# Patient Record
Sex: MALE | Race: WHITE | NOT HISPANIC OR LATINO | URBAN - METROPOLITAN AREA
[De-identification: names, ages, dates, MRNs, and addresses within clinical notes are randomized per-mention and may not be internally consistent; named-entity substitution may affect disease eponyms.]

---

## 2018-08-14 ENCOUNTER — APPOINTMENT (OUTPATIENT)
Dept: LAB | Facility: HOSPITAL | Age: 68
End: 2018-08-14
Attending: INTERNAL MEDICINE
Payer: COMMERCIAL

## 2018-08-14 ENCOUNTER — OFFICE VISIT (OUTPATIENT)
Dept: PRIMARY CARE | Facility: CLINIC | Age: 68
End: 2018-08-14
Payer: COMMERCIAL

## 2018-08-14 VITALS
TEMPERATURE: 98.1 F | HEART RATE: 74 BPM | BODY MASS INDEX: 27.6 KG/M2 | RESPIRATION RATE: 14 BRPM | OXYGEN SATURATION: 98 % | SYSTOLIC BLOOD PRESSURE: 120 MMHG | HEIGHT: 72 IN | DIASTOLIC BLOOD PRESSURE: 76 MMHG | WEIGHT: 203.8 LBS

## 2018-08-14 DIAGNOSIS — Z12.5 SCREENING FOR MALIGNANT NEOPLASM OF PROSTATE: ICD-10-CM

## 2018-08-14 DIAGNOSIS — Z00.00 PREVENTATIVE HEALTH CARE: Primary | ICD-10-CM

## 2018-08-14 DIAGNOSIS — Z00.00 PREVENTATIVE HEALTH CARE: ICD-10-CM

## 2018-08-14 LAB
ALBUMIN SERPL-MCNC: 4.1 G/DL (ref 3.4–5)
ALP SERPL-CCNC: 85 IU/L (ref 35–126)
ALT SERPL-CCNC: 51 IU/L (ref 16–63)
ANION GAP SERPL CALC-SCNC: 10 MEQ/L (ref 3–15)
AST SERPL-CCNC: 43 IU/L (ref 15–41)
BASOPHILS # BLD: 0.05 K/UL (ref 0.01–0.1)
BASOPHILS NFR BLD: 0.8 %
BILIRUB SERPL-MCNC: 2 MG/DL (ref 0.3–1.2)
BUN SERPL-MCNC: 21 MG/DL (ref 8–20)
CALCIUM SERPL-MCNC: 9.4 MG/DL (ref 8.9–10.3)
CHLORIDE SERPL-SCNC: 105 MMOL/L (ref 98–109)
CHOLEST SERPL-MCNC: 166 MG/DL
CO2 SERPL-SCNC: 25 MMOL/L (ref 22–32)
CREAT SERPL-MCNC: 1 MG/DL (ref 0.8–1.3)
DIFFERENTIAL METHOD BLD: ABNORMAL
EOSINOPHIL # BLD: 0.18 K/UL (ref 0.04–0.54)
EOSINOPHIL NFR BLD: 2.7 %
ERYTHROCYTE [DISTWIDTH] IN BLOOD BY AUTOMATED COUNT: 12.7 % (ref 11.6–14.4)
EST. AVERAGE GLUCOSE BLD GHB EST-MCNC: 105 MG/DL
GFR SERPL CREATININE-BSD FRML MDRD: >60 ML/MIN/1.73M*2
GLUCOSE SERPL-MCNC: 95 MG/DL (ref 70–99)
HBA1C MFR BLD HPLC: 5.3 %
HCT VFR BLDCO AUTO: 41.9 % (ref 40.1–51)
HDLC SERPL-MCNC: 35 MG/DL
HDLC SERPL: 4.7 {RATIO}
HGB BLD-MCNC: 13.8 G/DL (ref 13.7–17.5)
IMM GRANULOCYTES # BLD AUTO: 0.03 K/UL (ref 0–0.08)
IMM GRANULOCYTES NFR BLD AUTO: 0.5 %
LDLC SERPL CALC-MCNC: 106 MG/DL
LYMPHOCYTES # BLD: 1.14 K/UL (ref 1.2–3.5)
LYMPHOCYTES NFR BLD: 17.2 %
MCH RBC QN AUTO: 27.4 PG (ref 28–33.2)
MCHC RBC AUTO-ENTMCNC: 32.9 G/DL (ref 32.2–36.5)
MCV RBC AUTO: 83.1 FL (ref 83–98)
MONOCYTES # BLD: 0.54 K/UL (ref 0.3–1)
MONOCYTES NFR BLD: 8.1 %
NEUTROPHILS # BLD: 4.69 K/UL (ref 1.7–7)
NEUTS SEG NFR BLD: 70.7 %
NONHDLC SERPL-MCNC: 131 MG/DL
NRBC BLD-RTO: 0 %
PDW BLD AUTO: 9.9 FL (ref 9.4–12.4)
PLATELET # BLD AUTO: 213 K/UL (ref 150–350)
POTASSIUM SERPL-SCNC: 4.2 MMOL/L (ref 3.6–5.1)
PROT SERPL-MCNC: 6.1 G/DL (ref 6–8.2)
PSA SERPL-MCNC: 0.14 NG/ML
RBC # BLD AUTO: 5.04 M/UL (ref 4.5–5.8)
SODIUM SERPL-SCNC: 140 MMOL/L (ref 136–144)
TRIGL SERPL-MCNC: 123 MG/DL (ref 30–149)
WBC # BLD AUTO: 6.63 K/UL (ref 3.8–10.5)

## 2018-08-14 PROCEDURE — 84153 ASSAY OF PSA TOTAL: CPT

## 2018-08-14 PROCEDURE — 36415 COLL VENOUS BLD VENIPUNCTURE: CPT

## 2018-08-14 PROCEDURE — 83036 HEMOGLOBIN GLYCOSYLATED A1C: CPT

## 2018-08-14 PROCEDURE — 99397 PER PM REEVAL EST PAT 65+ YR: CPT | Performed by: INTERNAL MEDICINE

## 2018-08-14 PROCEDURE — 80061 LIPID PANEL: CPT

## 2018-08-14 PROCEDURE — 93000 ELECTROCARDIOGRAM COMPLETE: CPT | Performed by: INTERNAL MEDICINE

## 2018-08-14 PROCEDURE — 80053 COMPREHEN METABOLIC PANEL: CPT

## 2018-08-14 PROCEDURE — 85025 COMPLETE CBC W/AUTO DIFF WBC: CPT

## 2018-08-14 RX ORDER — IBUPROFEN 100 MG/5ML
100 SUSPENSION, ORAL (FINAL DOSE FORM) ORAL DAILY
COMMUNITY

## 2018-08-14 RX ORDER — GLUCOSAMINE SULFATE 500 MG
TABLET ORAL
COMMUNITY
End: 2020-11-27 | Stop reason: ENTERED-IN-ERROR

## 2018-08-14 RX ORDER — GARLIC 1000 MG
CAPSULE ORAL DAILY
COMMUNITY

## 2018-08-14 RX ORDER — CALCIUM CARB/VITAMIN D3/VIT K1 500-500-40
1 TABLET,CHEWABLE ORAL DAILY
COMMUNITY

## 2018-08-14 RX ORDER — B-COMPLEX WITH VITAMIN C
1 TABLET ORAL DAILY
COMMUNITY
End: 2020-11-27 | Stop reason: ENTERED-IN-ERROR

## 2018-08-14 RX ORDER — MOMETASONE FUROATE MONOHYDRATE 50 UG/1
SPRAY, METERED NASAL
COMMUNITY
Start: 2017-09-25 | End: 2018-09-18 | Stop reason: SDUPTHER

## 2018-08-14 RX ORDER — MULTIVITAMIN
1 TABLET ORAL DAILY
COMMUNITY
Start: 2011-07-29

## 2018-08-14 RX ORDER — LANOLIN ALCOHOL/MO/W.PET/CERES
100 CREAM (GRAM) TOPICAL DAILY
COMMUNITY

## 2018-08-14 RX ORDER — VIT C/E/ZN/COPPR/LUTEIN/ZEAXAN 250MG-90MG
1000 CAPSULE ORAL DAILY
COMMUNITY

## 2018-08-14 ASSESSMENT — ENCOUNTER SYMPTOMS
BRUISES/BLEEDS EASILY: 0
ENDOCRINE NEGATIVE: 1
NERVOUS/ANXIOUS: 0
SLEEP DISTURBANCE: 0
COUGH: 0
WOUND: 0
DIZZINESS: 0
ARTHRALGIAS: 1
ADENOPATHY: 0
WHEEZING: 0
CARDIOVASCULAR NEGATIVE: 1
NECK PAIN: 0
CONFUSION: 0
NAUSEA: 0
CONSTITUTIONAL NEGATIVE: 1
BLOOD IN STOOL: 0
ABDOMINAL DISTENTION: 0
BACK PAIN: 0
TREMORS: 0
DIARRHEA: 0
SORE THROAT: 0
HEMATURIA: 0
FREQUENCY: 0
WEAKNESS: 0
LIGHT-HEADEDNESS: 0
HEADACHES: 0
SEIZURES: 0
APNEA: 0
TROUBLE SWALLOWING: 0
SHORTNESS OF BREATH: 0
VOMITING: 0
ABDOMINAL PAIN: 0
DYSURIA: 0

## 2018-08-14 NOTE — ASSESSMENT & PLAN NOTE
I am deferring his GLENN since he is going to be checking his PSA shortly and I wish to avoid spurious elevation of this lab result.

## 2018-08-14 NOTE — ASSESSMENT & PLAN NOTE
Screening blood work ordered I await the results.  His last colonoscopy was performed about 3 or 4 years ago, he may well be due next year but I do not have access to his most recent report which he will track down from

## 2018-08-14 NOTE — PROGRESS NOTES
Subjective      Patient ID: Dannie Hardin is a 68 y.o. male.    HPI    The following have been reviewed and updated as appropriate in this visit:       Review of Systems   Constitutional: Negative.    HENT: Negative for congestion, hearing loss, nosebleeds, postnasal drip, sore throat, tinnitus and trouble swallowing.    Eyes: Negative for visual disturbance.   Respiratory: Negative for apnea, cough, shortness of breath and wheezing.    Cardiovascular: Negative.    Gastrointestinal: Negative for abdominal distention, abdominal pain, blood in stool, diarrhea, nausea and vomiting.   Endocrine: Negative.    Genitourinary: Negative for dysuria, frequency, hematuria and urgency.        No nocturia     Musculoskeletal: Positive for arthralgias (R foot pain). Negative for back pain and neck pain.   Skin: Negative for rash and wound.   Allergic/Immunologic: Negative for environmental allergies.   Neurological: Negative for dizziness, tremors, seizures, syncope, weakness, light-headedness and headaches.   Hematological: Negative for adenopathy. Does not bruise/bleed easily.   Psychiatric/Behavioral: Negative for confusion and sleep disturbance. The patient is not nervous/anxious.    viable    Objective     Physical Exam   Constitutional: He appears well-developed and well-nourished.   HENT:   Head: Normocephalic.   Eyes: Pupils are equal, round, and reactive to light.   Neck: Normal range of motion. No JVD present. No thyromegaly present.   Cardiovascular: Normal rate and regular rhythm.    No murmur heard.  Pulmonary/Chest: Effort normal and breath sounds normal. No respiratory distress. He has no wheezes. He has no rales.   Abdominal: Soft. He exhibits no distension. There is no tenderness.   Musculoskeletal: Normal range of motion. He exhibits no edema.   Lymphadenopathy:     He has no cervical adenopathy.   Neurological: He is alert. No cranial nerve deficit.   Skin: Skin is warm.   Psychiatric: He has a normal mood and  affect. His behavior is normal.   Vitals reviewed.      Assessment/Plan   Problem List Items Addressed This Visit     None      Visit Diagnoses     Preventative health care    -  Primary    Relevant Orders    ECG 12 LEAD OFFICE PERFORMED (Completed)    CBC and Differential    Comprehensive metabolic panel    Lipid panel    Hemoglobin A1c    Screening for malignant neoplasm of prostate        Relevant Orders    PSA

## 2018-09-04 ENCOUNTER — TELEPHONE (OUTPATIENT)
Dept: INTERNAL MEDICINE | Facility: HOSPITAL | Age: 68
End: 2018-09-04

## 2018-09-04 ENCOUNTER — TELEPHONE (OUTPATIENT)
Dept: PRIMARY CARE | Facility: CLINIC | Age: 68
End: 2018-09-04

## 2018-09-04 NOTE — TELEPHONE ENCOUNTER
Left message in regards stating that forms being completed as well as faxed over. If have any questions give office a call back

## 2018-09-18 RX ORDER — MOMETASONE FUROATE MONOHYDRATE 50 UG/1
1 SPRAY, METERED NASAL DAILY
Qty: 17 G | Refills: 2 | Status: SHIPPED | OUTPATIENT
Start: 2018-09-18 | End: 2019-01-08 | Stop reason: ALTCHOICE

## 2018-10-17 ENCOUNTER — TRANSCRIBE ORDERS (OUTPATIENT)
Dept: SCHEDULING | Age: 68
End: 2018-10-17

## 2018-10-17 DIAGNOSIS — M79.671 PAIN OF RIGHT FOOT: Primary | ICD-10-CM

## 2018-10-18 ENCOUNTER — HOSPITAL ENCOUNTER (OUTPATIENT)
Dept: RADIOLOGY | Facility: HOSPITAL | Age: 68
Discharge: HOME | End: 2018-10-18
Attending: PODIATRIST
Payer: COMMERCIAL

## 2018-10-18 DIAGNOSIS — M79.671 PAIN OF RIGHT FOOT: ICD-10-CM

## 2018-10-18 PROCEDURE — 77073 BONE LENGTH STUDIES: CPT

## 2019-01-08 ENCOUNTER — TELEPHONE (OUTPATIENT)
Dept: PRIMARY CARE | Facility: CLINIC | Age: 69
End: 2019-01-08

## 2019-01-08 RX ORDER — FLUTICASONE PROPIONATE 50 MCG
1 SPRAY, SUSPENSION (ML) NASAL DAILY
Qty: 1 BOTTLE | Refills: 3 | Status: SHIPPED | OUTPATIENT
Start: 2019-01-08 | End: 2019-08-14 | Stop reason: SDUPTHER

## 2019-03-01 ENCOUNTER — OFFICE VISIT (OUTPATIENT)
Dept: PRIMARY CARE | Facility: CLINIC | Age: 69
End: 2019-03-01
Payer: COMMERCIAL

## 2019-03-01 VITALS
HEIGHT: 72 IN | HEART RATE: 84 BPM | OXYGEN SATURATION: 96 % | TEMPERATURE: 98 F | WEIGHT: 208 LBS | RESPIRATION RATE: 17 BRPM | SYSTOLIC BLOOD PRESSURE: 112 MMHG | BODY MASS INDEX: 28.17 KG/M2 | DIASTOLIC BLOOD PRESSURE: 70 MMHG

## 2019-03-01 DIAGNOSIS — N52.9 ERECTILE DYSFUNCTION, UNSPECIFIED ERECTILE DYSFUNCTION TYPE: ICD-10-CM

## 2019-03-01 DIAGNOSIS — S92.344D CLOSED NONDISPLACED FRACTURE OF FOURTH METATARSAL BONE OF RIGHT FOOT WITH ROUTINE HEALING, SUBSEQUENT ENCOUNTER: ICD-10-CM

## 2019-03-01 DIAGNOSIS — G62.9 NEUROPATHY: Primary | ICD-10-CM

## 2019-03-01 PROCEDURE — 99214 OFFICE O/P EST MOD 30 MIN: CPT | Performed by: INTERNAL MEDICINE

## 2019-03-01 RX ORDER — SILDENAFIL 100 MG/1
100 TABLET, FILM COATED ORAL DAILY PRN
Qty: 10 TABLET | Refills: 5 | Status: SHIPPED | OUTPATIENT
Start: 2019-03-01 | End: 2020-10-05

## 2019-03-01 RX ORDER — GABAPENTIN 100 MG/1
100 CAPSULE ORAL 3 TIMES DAILY
Qty: 90 CAPSULE | Refills: 1 | Status: SHIPPED | OUTPATIENT
Start: 2019-03-01 | End: 2019-04-21 | Stop reason: SDUPTHER

## 2019-03-01 ASSESSMENT — ENCOUNTER SYMPTOMS
NECK STIFFNESS: 0
DIZZINESS: 0
VOMITING: 0
ADENOPATHY: 0
POLYDIPSIA: 0
SHORTNESS OF BREATH: 0
NAUSEA: 0
ARTHRALGIAS: 1
LIGHT-HEADEDNESS: 0
SLEEP DISTURBANCE: 0
FATIGUE: 0
JOINT SWELLING: 0
TROUBLE SWALLOWING: 0
BACK PAIN: 0
NERVOUS/ANXIOUS: 0

## 2019-03-01 NOTE — PROGRESS NOTES
Subjective      Patient ID: Dannie Hardin is a 68 y.o. male.    He is here for follow-up and to address a few issues  1.  Paresthesias in both feet  2.  Erectile dysfunction issues  3.  Issues related to metatarsal fracture right foot        The following have been reviewed and updated as appropriate in this visit:       Review of Systems   Constitutional: Negative for fatigue.   HENT: Negative for trouble swallowing.    Eyes: Negative for visual disturbance.   Respiratory: Negative for shortness of breath.    Cardiovascular: Negative for chest pain.   Gastrointestinal: Negative for nausea and vomiting.   Endocrine: Negative for polydipsia and polyuria.   Genitourinary:        No nocturia   Musculoskeletal: Positive for arthralgias (resolving 4th foot pain). Negative for back pain, joint swelling and neck stiffness.   Neurological: Negative for dizziness and light-headedness.        Feet feel cold   Hematological: Negative for adenopathy.   Psychiatric/Behavioral: Negative for sleep disturbance. The patient is not nervous/anxious.    viable    Objective     Physical Exam   Constitutional: He appears well-developed.   HENT:   Head: Normocephalic.   Cardiovascular: Normal rate and regular rhythm.    Good pulsation B/L tibial   Pulmonary/Chest: Effort normal and breath sounds normal.   Musculoskeletal: Normal range of motion. He exhibits edema (R foot swelling).   Neurological: He is alert.   Vitals reviewed.      Assessment/Plan   Problem List Items Addressed This Visit     Neuropathy - Primary     He complains of a feeling of having mehreen under both feet when he walks especially problematic in the morning.  Is unclear what is causing this and an examination of his foot is fairly unremarkable.  He has been using insoles which have not been helpful and his last A1c was below the prediabetic range when taken about 6 months ago.  At this time to try low-dose gabapentin and I provided a referral for him to see a  neurologist since he would prefer not to go back to podiatrist at this time         Relevant Orders    Ambulatory referral to Neurology    Nondisplaced fracture of fourth metatarsal bone of right foot with routine healing     Comments of [facility] orthopedic group are as noted.  There is still some swelling of his right foot but overall he states the pain is diminished.  Nothing new is warranted at this time in terms of any evaluation or intervention         Erectile dysfunction     This is an ongoing and understandably frustrating issue for him.  At this time I want to check a testosterone level and I prescribed Viagra for him.  He remains very physically active and his weight is fairly stable having only gained 5 pounds in the last several months.         Relevant Orders    Testosterone, total and free

## 2019-03-01 NOTE — ASSESSMENT & PLAN NOTE
This is an ongoing and understandably frustrating issue for him.  At this time I want to check a testosterone level and I prescribed Viagra for him.  He remains very physically active and his weight is fairly stable having only gained 5 pounds in the last several months.

## 2019-03-01 NOTE — ASSESSMENT & PLAN NOTE
Comments of [facility] orthopedic group are as noted.  There is still some swelling of his right foot but overall he states the pain is diminished.  Nothing new is warranted at this time in terms of any evaluation or intervention

## 2019-03-01 NOTE — ASSESSMENT & PLAN NOTE
He complains of a feeling of having mehreen under both feet when he walks especially problematic in the morning.  Is unclear what is causing this and an examination of his foot is fairly unremarkable.  He has been using insoles which have not been helpful and his last A1c was below the prediabetic range when taken about 6 months ago.  At this time to try low-dose gabapentin and I provided a referral for him to see a neurologist since he would prefer not to go back to podiatrist at this time

## 2019-03-05 LAB
TESTOST FREE SERPL-MCNC: 5.2 PG/ML (ref 6.6–18.1)
TESTOST SERPL-MCNC: 304 NG/DL (ref 264–916)

## 2019-03-12 ENCOUNTER — TELEPHONE (OUTPATIENT)
Dept: PRIMARY CARE | Facility: CLINIC | Age: 69
End: 2019-03-12

## 2019-03-12 NOTE — TELEPHONE ENCOUNTER
LVM to return my call.      ----- Message from Edenilson Haile MD sent at 3/9/2019  2:13 PM EST -----  This is a 68-year-old patient of mine who is in for routine follow-up visit, complaints primarily being neuropathy and some issue with ED  I checked a testosterone level which is in the low end of normal at 304 with a slightly low free testosterone level of 5.2.  He might benefit from testosterone and if he wants that we can try it, highlighting the risks associated with in terms of a slight increased risk of prostate problems.  If you would like to try we can do AndroGel.  I did prescribe Viagra for him already

## 2019-03-14 RX ORDER — TESTOSTERONE 25 MG/2.5G
25 GEL TRANSDERMAL DAILY
Qty: 30 PACKET | Refills: 5 | Status: SHIPPED | OUTPATIENT
Start: 2019-03-14 | End: 2019-04-24 | Stop reason: ALTCHOICE

## 2019-04-01 ENCOUNTER — OFFICE VISIT (OUTPATIENT)
Dept: NEUROLOGY | Facility: CLINIC | Age: 69
End: 2019-04-01
Payer: COMMERCIAL

## 2019-04-01 ENCOUNTER — APPOINTMENT (OUTPATIENT)
Dept: LAB | Facility: HOSPITAL | Age: 69
End: 2019-04-01
Attending: PSYCHIATRY & NEUROLOGY
Payer: COMMERCIAL

## 2019-04-01 VITALS — HEART RATE: 82 BPM | DIASTOLIC BLOOD PRESSURE: 76 MMHG | RESPIRATION RATE: 16 BRPM | SYSTOLIC BLOOD PRESSURE: 122 MMHG

## 2019-04-01 DIAGNOSIS — R52 PAIN: ICD-10-CM

## 2019-04-01 DIAGNOSIS — R20.0 NUMBNESS: ICD-10-CM

## 2019-04-01 DIAGNOSIS — R52 PAIN: Primary | ICD-10-CM

## 2019-04-01 LAB
FOLATE SERPL-MCNC: >20 NG/ML
TSH SERPL DL<=0.05 MIU/L-ACNC: 2.35 MIU/L (ref 0.34–5.6)
VIT B12 SERPL-MCNC: 195 PG/ML (ref 180–914)

## 2019-04-01 PROCEDURE — 99244 OFF/OP CNSLTJ NEW/EST MOD 40: CPT | Performed by: PSYCHIATRY & NEUROLOGY

## 2019-04-01 PROCEDURE — 86618 LYME DISEASE ANTIBODY: CPT

## 2019-04-01 PROCEDURE — 84165 PROTEIN E-PHORESIS SERUM: CPT

## 2019-04-01 PROCEDURE — 36415 COLL VENOUS BLD VENIPUNCTURE: CPT

## 2019-04-01 PROCEDURE — 82607 VITAMIN B-12: CPT

## 2019-04-01 PROCEDURE — 82746 ASSAY OF FOLIC ACID SERUM: CPT

## 2019-04-01 PROCEDURE — 86592 SYPHILIS TEST NON-TREP QUAL: CPT

## 2019-04-01 PROCEDURE — 84443 ASSAY THYROID STIM HORMONE: CPT

## 2019-04-01 RX ORDER — GLUCOSAMINE/CHONDR SU A SOD 167-133 MG
500 CAPSULE ORAL
COMMUNITY

## 2019-04-01 RX ORDER — AMOXICILLIN 500 MG/1
1 CAPSULE ORAL 4 TIMES DAILY
Refills: 1 | COMMUNITY
Start: 2019-03-24 | End: 2019-04-24 | Stop reason: ALTCHOICE

## 2019-04-01 NOTE — LETTER
April 1, 2019     Edenilson Haile MD  [address]  [address]  [facility] [place] [address]    Patient: Dannie Hardin   YOB: 1950   Date of Visit: 4/1/2019       Dear Dr. Haile:    Thank you for referring Dannie Hardin to me for evaluation. Below are my notes for this consultation.    If you have questions, please do not hesitate to call me. I look forward to following your patient along with you.         Sincerely,        Ceasar French MD        CC: No Recipients  Ceasar French MD  4/1/2019 10:19 AM  Signed  Dannie Hardin is a 69 y.o. male  4/1/2019  Edenilson Haile MD    Neurology Consult Note    Subjective     Dannie Hardin is a 69 y.o. male who is being evaluated for foot symptoms.  The patient reported that about 1 year ago he began to experience numbness and tingling in the plantar surface of the feet right equal to left.  Initially his symptoms would come and go however over time have become more frequent and severe.  Towards the summer 2018, the patient reported pain in the plantar surface of the right foot without any illness, trauma or inciting event.  He was evaluated by a podiatrist who told him that he had a previous stress fracture.  He wore a boot however his condition got worse.  He was then evaluated by another podiatrist who told him that he did have a stress fracture and again the patient wore a boot.    The patient reported a combination of numbness, paresthesias and dysesthesias mostly over the distal plantar surface of the feet now right greater than left.  The patient started wearing orthotics and attend physical therapy and his pain has improved however his numbness and paresthesias remain about the same.  His symptoms tend to be worse in the morning and gets better as the day goes on.  Sometimes he feels slightly off balanced although he is not weak and has not stumbled, fallen or injured himself.  He has been unable to identify a trigger for his symptoms or  factors that make his symptoms better or worse.    The patient reported that his neck is unaffected.  On rare occasions he can have numbness and heaviness in the right arm.  His left arm is unaffected.  He has no lower back pain or sciatica.  He denied weakness, ataxia or bowel or bladder dysfunction.  He has had some autonomic symptoms including produce sweating and erectile dysfunction.  There were no symptoms to suggest increased intracranial pressure, meningitis or systemic illness.    Review of Systems  Constitutional: negative  Eyes: negative  Ears, nose, mouth, throat, and face: negative  Respiratory: negative  Cardiovascular: negative  Gastrointestinal: negative  Genitourinary:negative  Integument/breast: negative  Hematologic/lymphatic: negative  Musculoskeletal:negative  Neurological: negative  Behavioral/Psych: negative  Endocrine: negative  Allergic/Immunologic: negative    Allergy:  Allergies   Allergen Reactions   • No Known Allergies        Current Outpatient Prescriptions   Medication Sig Dispense Refill   • gabapentin (NEURONTIN) 100 mg capsule Take 1 capsule (100 mg total) by mouth 3 (three) times a day. 90 capsule 1   • niacin 500 mg tablet Take 500 mg by mouth daily with breakfast.     • amoxicillin (AMOXIL) 500 mg capsule Take 1 capsule by mouth 4 (four) times a day.  1   • ascorbic acid, vitamin C, (vitamin C) 100 mg tablet Take 100 mg by mouth daily.     • B-complex with vitamin C tablet Take 1 tablet by mouth daily.     • cholecalciferol, vitamin D3, (VITAMIN D3) 1,000 unit capsule Take 1,000 Units by mouth daily.     • chromium picolinate 1,000 mcg tablet Take by mouth.     • fluticasone (FLONASE) 50 mcg/actuation nasal spray Administer 1 spray into each nostril daily. 1 Bottle 3   • garlic 1 mg capsule Take by mouth.     • glucosamine sulfate (GLUCOSAMINE) 500 mg tablet Take by mouth.     • lactobacillus 3/FOS/pantethine (PROBIOTIC AND ACIDOPHILUS ORAL) Take by mouth.     • magnesium 30 mg  tablet Take 30 mg by mouth 2 (two) times a day.     • multivitamin (THERAGRAN) tablet take 1 tablet by oral route  every day with food     • omega 3-dha-epa-fish oil (FISH OIL) 138-183-1,000 mg capsule Take by mouth.     • sildenafil (VIAGRA) 100 mg tablet Take 1 tablet (100 mg total) by mouth daily as needed for erectile dysfunction. 10 tablet 5   • testosterone (ANDROGEL) 1 % (25 mg/2.5gram) gel in packet Place 1 packet (25 mg total) on the skin daily. 30 packet 5   • thiamine (vitamin B-1) 100 mg tablet Take 100 mg by mouth daily.     • vitamin E 200 unit capsule Take 200 Units by mouth daily.       No current facility-administered medications for this visit.        Past Medical History:  Past Medical History:   Diagnosis Date   • Bowel obstruction (CMS/HCC) (HCC)        Past Surgical History:  Past Surgical History:   Procedure Laterality Date   • COLON SURGERY  03/12/2015   • COLON SURGERY  07/13/2015   • FOOT SURGERY Right    • RESECTION SMALL BOWEL / CLOSURE ILEOSTOMY         Social History:  Social History     Social History   • Marital status:      Spouse name: N/A   • Number of children: N/A   • Years of education: N/A     Social History Main Topics   • Smoking status: Former Smoker   • Smokeless tobacco: Never Used   • Alcohol use No   • Drug use: No   • Sexual activity: Not Asked     Other Topics Concern   • None     Social History Narrative   • None       Family History:  Family History   Problem Relation Age of Onset   • Hyperlipidemia Mother    • Angina Mother    • Hypertension Mother    • Stroke Father    • Diabetes Father        Objective     Physical Exam  /76   Pulse 82   Resp 16     General Appearance:  Alert, no distress, appears stated age   Head:  Normocephalic, without obvious abnormality, atraumatic   Eyes:  PERRL, conjunctiva/corneas clear, EOM's intact   Throat:  The tongue and uvula were midline   Neck: Supple, symmetrical, trachea midline, no carotid bruit or JVD   Lungs:   Clear to auscultation bilaterally, respirations unlabored   Chest Wall: No tenderness or deformity   Heart Regular rate and rhythm, S1 and S2 normal, no murmur, rub or gallop   Extremities: Extremities normal, atraumatic, no cyanosis or edema    Musculoskeletal: No injury or deformity   Pulses: 2+ and symmetric all extremities   Skin: Skin color, texture, turgor normal, no rashes or lesions   Behavior/Emotional: Appropriate, cooperative   Neurologic Exam:  Alert and oriented. Attention, concentration, memory, language, visual spatial orientation, executive function is normal.    Pupils equal round and reactive to light. Extraocular movement full with normal pursuit + saccades. No nystagmus noted.   Facial strength and sensation is normal. Hearing normal.  The tongue and uvula were midline. No dysarthria or dysphagia.   Strength was 5/5 in bulbar, axial + extremity muscles.There was normal bulk and tone with no abnormal movements.   Small fiber sensory modalities were reduced in the feet.  There was no dysmetria or cerebellar signs.   The gait was narrow based. Patient was able to tandem walk. Negative Romberg sign. Reflexes were ++ in the arms and plus in the legs. Ledesma sign was negative. Plantar responses were flexor.     Data Reviewed:  Blood work was notable for a normal CBC, CMP and hemoglobin A1c    Problem List Items Addressed This Visit     Pain - Primary    Current Assessment & Plan     The patient has what sounds like a combination of musculoskeletal pain as well as neuropathic pain.  From a musculoskeletal perspective he can certainly continue to follow with his podiatrist and take anti-inflammatory medications as needed.  It also however sounds as though he has neuropathic pain.  He reported that his symptoms have improved somewhat with gabapentin 100 mg twice daily.  I recommended he increase his dose to 3 times daily in the future will certainly titrate the medication up as tolerated.  He is on a  very low dose however reported that he does not like taking medications.         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  We will check blood work to look for organic causes of his condition.  In the future we will check an EMG looking for objective evidence of a neuromuscular disorder.  Small fiber neuropathies can result in sensory symptoms and autonomic dysfunction however fortunately typically do not result in weakness or imbalance.  Additional diagnostics and treatments will depend on his clinical course and the results of his pending studies.         Relevant Orders    Folate    Protein Elect, Serum w/Interp    TSH    Vitamin B12    Lyme EIA reflex WB    RPR    EMG    Nerve conduction test            It was a real pleasure meeting Dannie Hardin today, thank you for allowing me to participate in the medical care. If you have any questions, please call me at any time. Dannie Hardin will follow up with me in the coming weeks to months and keep me updated by telephone. Dannie Hardin knows to notify me immediately if there is any change in the condition or if there are any new symptoms of transient or static neurologic dysfunction.    Ceasar French MD

## 2019-04-01 NOTE — ASSESSMENT & PLAN NOTE
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  We will check blood work to look for organic causes of his condition.  In the future we will check an EMG looking for objective evidence of a neuromuscular disorder.  Small fiber neuropathies can result in sensory symptoms and autonomic dysfunction however fortunately typically do not result in weakness or imbalance.  Additional diagnostics and treatments will depend on his clinical course and the results of his pending studies.

## 2019-04-01 NOTE — PROGRESS NOTES
Dannie Hardin is a 69 y.o. male  4/1/2019  Edenilson Haile MD    Neurology Consult Note    Subjective     Dannie Hardin is a 69 y.o. male who is being evaluated for foot symptoms.  The patient reported that about 1 year ago he began to experience numbness and tingling in the plantar surface of the feet right equal to left.  Initially his symptoms would come and go however over time have become more frequent and severe.  Towards the summer 2018, the patient reported pain in the plantar surface of the right foot without any illness, trauma or inciting event.  He was evaluated by a podiatrist who told him that he had a previous stress fracture.  He wore a boot however his condition got worse.  He was then evaluated by another podiatrist who told him that he did have a stress fracture and again the patient wore a boot.    The patient reported a combination of numbness, paresthesias and dysesthesias mostly over the distal plantar surface of the feet now right greater than left.  The patient started wearing orthotics and attend physical therapy and his pain has improved however his numbness and paresthesias remain about the same.  His symptoms tend to be worse in the morning and gets better as the day goes on.  Sometimes he feels slightly off balanced although he is not weak and has not stumbled, fallen or injured himself.  He has been unable to identify a trigger for his symptoms or factors that make his symptoms better or worse.    The patient reported that his neck is unaffected.  On rare occasions he can have numbness and heaviness in the right arm.  His left arm is unaffected.  He has no lower back pain or sciatica.  He denied weakness, ataxia or bowel or bladder dysfunction.  He has had some autonomic symptoms including produce sweating and erectile dysfunction.  There were no symptoms to suggest increased intracranial pressure, meningitis or systemic illness.    Review of Systems  Constitutional: negative  Eyes:  negative  Ears, nose, mouth, throat, and face: negative  Respiratory: negative  Cardiovascular: negative  Gastrointestinal: negative  Genitourinary:negative  Integument/breast: negative  Hematologic/lymphatic: negative  Musculoskeletal:negative  Neurological: negative  Behavioral/Psych: negative  Endocrine: negative  Allergic/Immunologic: negative    Allergy:  Allergies   Allergen Reactions   • No Known Allergies        Current Outpatient Prescriptions   Medication Sig Dispense Refill   • gabapentin (NEURONTIN) 100 mg capsule Take 1 capsule (100 mg total) by mouth 3 (three) times a day. 90 capsule 1   • niacin 500 mg tablet Take 500 mg by mouth daily with breakfast.     • amoxicillin (AMOXIL) 500 mg capsule Take 1 capsule by mouth 4 (four) times a day.  1   • ascorbic acid, vitamin C, (vitamin C) 100 mg tablet Take 100 mg by mouth daily.     • B-complex with vitamin C tablet Take 1 tablet by mouth daily.     • cholecalciferol, vitamin D3, (VITAMIN D3) 1,000 unit capsule Take 1,000 Units by mouth daily.     • chromium picolinate 1,000 mcg tablet Take by mouth.     • fluticasone (FLONASE) 50 mcg/actuation nasal spray Administer 1 spray into each nostril daily. 1 Bottle 3   • garlic 1 mg capsule Take by mouth.     • glucosamine sulfate (GLUCOSAMINE) 500 mg tablet Take by mouth.     • lactobacillus 3/FOS/pantethine (PROBIOTIC AND ACIDOPHILUS ORAL) Take by mouth.     • magnesium 30 mg tablet Take 30 mg by mouth 2 (two) times a day.     • multivitamin (THERAGRAN) tablet take 1 tablet by oral route  every day with food     • omega 3-dha-epa-fish oil (FISH OIL) 138-183-1,000 mg capsule Take by mouth.     • sildenafil (VIAGRA) 100 mg tablet Take 1 tablet (100 mg total) by mouth daily as needed for erectile dysfunction. 10 tablet 5   • testosterone (ANDROGEL) 1 % (25 mg/2.5gram) gel in packet Place 1 packet (25 mg total) on the skin daily. 30 packet 5   • thiamine (vitamin B-1) 100 mg tablet Take 100 mg by mouth daily.     •  vitamin E 200 unit capsule Take 200 Units by mouth daily.       No current facility-administered medications for this visit.        Past Medical History:  Past Medical History:   Diagnosis Date   • Bowel obstruction (CMS/HCC) (HCC)        Past Surgical History:  Past Surgical History:   Procedure Laterality Date   • COLON SURGERY  03/12/2015   • COLON SURGERY  07/13/2015   • FOOT SURGERY Right    • RESECTION SMALL BOWEL / CLOSURE ILEOSTOMY         Social History:  Social History     Social History   • Marital status:      Spouse name: N/A   • Number of children: N/A   • Years of education: N/A     Social History Main Topics   • Smoking status: Former Smoker   • Smokeless tobacco: Never Used   • Alcohol use No   • Drug use: No   • Sexual activity: Not Asked     Other Topics Concern   • None     Social History Narrative   • None       Family History:  Family History   Problem Relation Age of Onset   • Hyperlipidemia Mother    • Angina Mother    • Hypertension Mother    • Stroke Father    • Diabetes Father        Objective     Physical Exam  /76   Pulse 82   Resp 16     General Appearance:  Alert, no distress, appears stated age   Head:  Normocephalic, without obvious abnormality, atraumatic   Eyes:  PERRL, conjunctiva/corneas clear, EOM's intact   Throat:  The tongue and uvula were midline   Neck: Supple, symmetrical, trachea midline, no carotid bruit or JVD   Lungs:  Clear to auscultation bilaterally, respirations unlabored   Chest Wall: No tenderness or deformity   Heart Regular rate and rhythm, S1 and S2 normal, no murmur, rub or gallop   Extremities: Extremities normal, atraumatic, no cyanosis or edema    Musculoskeletal: No injury or deformity   Pulses: 2+ and symmetric all extremities   Skin: Skin color, texture, turgor normal, no rashes or lesions   Behavior/Emotional: Appropriate, cooperative   Neurologic Exam:  Alert and oriented. Attention, concentration, memory, language, visual spatial  orientation, executive function is normal.    Pupils equal round and reactive to light. Extraocular movement full with normal pursuit + saccades. No nystagmus noted.   Facial strength and sensation is normal. Hearing normal.  The tongue and uvula were midline. No dysarthria or dysphagia.   Strength was 5/5 in bulbar, axial + extremity muscles.There was normal bulk and tone with no abnormal movements.   Small fiber sensory modalities were reduced in the feet.  There was no dysmetria or cerebellar signs.   The gait was narrow based. Patient was able to tandem walk. Negative Romberg sign. Reflexes were ++ in the arms and plus in the legs. Ledesma sign was negative. Plantar responses were flexor.     Data Reviewed:  Blood work was notable for a normal CBC, CMP and hemoglobin A1c    Problem List Items Addressed This Visit     Pain - Primary    Current Assessment & Plan     The patient has what sounds like a combination of musculoskeletal pain as well as neuropathic pain.  From a musculoskeletal perspective he can certainly continue to follow with his podiatrist and take anti-inflammatory medications as needed.  It also however sounds as though he has neuropathic pain.  He reported that his symptoms have improved somewhat with gabapentin 100 mg twice daily.  I recommended he increase his dose to 3 times daily in the future will certainly titrate the medication up as tolerated.  He is on a very low dose however reported that he does not like taking medications.         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  We will check blood work to look for organic causes of his condition.  In the future we will check an EMG looking for objective evidence of a neuromuscular disorder.   Small fiber neuropathies can result in sensory symptoms and autonomic dysfunction however fortunately typically do not result in weakness or imbalance.  Additional diagnostics and treatments will depend on his clinical course and the results of his pending studies.         Relevant Orders    Folate    Protein Elect, Serum w/Interp    TSH    Vitamin B12    Lyme EIA reflex WB    RPR    EMG    Nerve conduction test            It was a real pleasure meeting Dannie Hardin today, thank you for allowing me to participate in the medical care. If you have any questions, please call me at any time. Dannie Hardin will follow up with me in the coming weeks to months and keep me updated by telephone. Dannie Hardin knows to notify me immediately if there is any change in the condition or if there are any new symptoms of transient or static neurologic dysfunction.    Ceasar French MD

## 2019-04-01 NOTE — ASSESSMENT & PLAN NOTE
The patient has what sounds like a combination of musculoskeletal pain as well as neuropathic pain.  From a musculoskeletal perspective he can certainly continue to follow with his podiatrist and take anti-inflammatory medications as needed.  It also however sounds as though he has neuropathic pain.  He reported that his symptoms have improved somewhat with gabapentin 100 mg twice daily.  I recommended he increase his dose to 3 times daily in the future will certainly titrate the medication up as tolerated.  He is on a very low dose however reported that he does not like taking medications.

## 2019-04-02 LAB
ALBUMIN MFR UR ELPH: 49.2 % (ref 48–62)
ALBUMIN SERPL ELPH-MCNC: 3.34 G/DL (ref 3.2–4.5)
ALBUMIN/GLOB SERPL: 1 {RATIO} (ref 1.1–2.1)
ALPHA1 GLOB MFR SERPL ELPH: 3.7 % (ref 2.1–4.8)
ALPHA1 GLOB SERPL ELPH-MCNC: 0.25 G/DL (ref 0.15–0.32)
ALPHA2 GLOB MFR UR ELPH: 14.4 % (ref 9.7–16.2)
ALPHA2 GLOB SERPL ELPH-MCNC: 0.98 G/DL (ref 0.7–1.1)
B BURGDOR AB SER IA-ACNC: 0.11 RATIO
B-GLOBULIN SERPL ELPH-MCNC: 0.95 G/DL (ref 0.73–1.3)
BETA1 GLOB MFR UR ELPH: 14 % (ref 10.8–17.9)
GAMMA GLOB MFR UR ELPH: 18.8 % (ref 9–23)
GAMMA GLOB SERPL ELPH-MCNC: 1.28 G/DL (ref 0.6–1.6)
PROT PATTERN SERPL ELPH-IMP: NORMAL
PROT SERPL-MCNC: 6.8 G/DL (ref 6–8.2)
RPR SER QL: NORMAL

## 2019-04-10 ENCOUNTER — OFFICE VISIT (OUTPATIENT)
Dept: NEUROLOGY | Facility: CLINIC | Age: 69
End: 2019-04-10
Payer: COMMERCIAL

## 2019-04-10 VITALS — HEART RATE: 82 BPM | SYSTOLIC BLOOD PRESSURE: 122 MMHG | RESPIRATION RATE: 16 BRPM | DIASTOLIC BLOOD PRESSURE: 76 MMHG

## 2019-04-10 DIAGNOSIS — R52 PAIN: ICD-10-CM

## 2019-04-10 DIAGNOSIS — R20.0 NUMBNESS: Primary | ICD-10-CM

## 2019-04-10 PROCEDURE — 95886 MUSC TEST DONE W/N TEST COMP: CPT | Performed by: PSYCHIATRY & NEUROLOGY

## 2019-04-10 PROCEDURE — 99999 PR OFFICE/OUTPT VISIT,PROCEDURE ONLY: CPT | Performed by: PSYCHIATRY & NEUROLOGY

## 2019-04-10 PROCEDURE — 95910 NRV CNDJ TEST 7-8 STUDIES: CPT | Performed by: PSYCHIATRY & NEUROLOGY

## 2019-04-10 NOTE — PROCEDURES
Dannie Hardin  1950  4/10/2019  Edenilson Haile MD      Clinical History:  Dannie Hardin is a 69-year-old who has had abnormal sensation in his feet for about 1 year.  He has a combination of numbness, paresthesias and rare dysesthesias.  He denied back pain or weakness.  Sometimes he is slightly off balance.  He has occasional paresthesias in the right arm.    Physical Examination:  He was a pleasant appearing man in no acute distress.  There was normal bulk and tone with no abnormal movements.  Strength was 5/5.  Small fiber sensory modalities were reduced in the feet.  Reflexes were plus plus in the arms and plus in the legs.    Nerve Conduction Studies:  The right sural and radial sensory studies were normal.    The right median, ulnar, peroneal and tibial motor studies were normal.    Needle EMG:  Needle EMG of selected muscles of the right L2-S1 myotomes was performed.  All muscles studied were normal; there was no evidence of acute or chronic denervation.    Impression:  This was a normal study of the right arm and leg.  There was no electrophysiologic evidence of a cervical or lumbosacral radiculopathy or plexopathy, focal neuropathy, polyneuropathy or other neuromuscular disorder at this time.    Clinical Note:  This was a normal study and there was no electrophysiologic evidence of a neuromuscular disorder.  It is certainly possible that the patient has a small fiber polyneuropathy.  Blood work was notable for a low B12 level possibly related to his prior GI surgery.  Folate, TSH, SPEP, Lyme and RPR were normal.    The patient will receive B12 supplementation.  He is on gabapentin 200 mg 3 times daily and reported some improvement in his condition.  In the future additional diagnostics and treatments will depend on his clinical course.      Sincerely,  Ceasar French M.D.   Modified Advancement Flap Text: The defect edges were debeveled with a #15 scalpel blade.  Given the location of the defect, shape of the defect and the proximity to free margins a modified advancement flap was deemed most appropriate.  Using a sterile surgical marker, an appropriate advancement flap was drawn incorporating the defect and placing the expected incisions within the relaxed skin tension lines where possible.    The area thus outlined was incised deep to adipose tissue with a #15 scalpel blade.  The skin margins were undermined to an appropriate distance in all directions utilizing iris scissors.

## 2019-04-10 NOTE — LETTER
April 10, 2019     Edenilson Haile MD  [address]  [place]  [facility] [id number]    Patient: Dannie Hardin   YOB: 1950   Date of Visit: 4/10/2019       Dear Dr. Haile:    Thank you for referring Dannie Hardin to me for evaluation. Below are my notes for this consultation.    If you have questions, please do not hesitate to call me. I look forward to following your patient along with you.         Sincerely,        Ceasar French MD        CC: No Recipients        Ceasar French MD  4/10/2019 10:03 AM  Signed  Dannie Hardin  1950  4/10/2019  Edenilson Haile MD      Clinical History:  Dannie Hardin is a 69-year-old who has had abnormal sensation in his feet for about 1 year.  He has a combination of numbness, paresthesias and rare dysesthesias.  He denied back pain or weakness.  Sometimes he is slightly off balance.  He has occasional paresthesias in the right arm.    Physical Examination:  He was a pleasant appearing man in no acute distress.  There was normal bulk and tone with no abnormal movements.  Strength was 5/5.  Small fiber sensory modalities were reduced in the feet.  Reflexes were plus plus in the arms and plus in the legs.    Nerve Conduction Studies:  The right sural and radial sensory studies were normal.    The right median, ulnar, peroneal and tibial motor studies were normal.    Needle EMG:  Needle EMG of selected muscles of the right L2-S1 myotomes was performed.  All muscles studied were normal; there was no evidence of acute or chronic denervation.    Impression:  This was a normal study of the right arm and leg.  There was no electrophysiologic evidence of a cervical or lumbosacral radiculopathy or plexopathy, focal neuropathy, polyneuropathy or other neuromuscular disorder at this time.    Clinical Note:  This was a normal study and there was no electrophysiologic evidence of a neuromuscular disorder.  It is certainly possible that the patient has a  small fiber polyneuropathy.  Blood work was notable for a low B12 level possibly related to his prior GI surgery.  Folate, TSH, SPEP, Lyme and RPR were normal.    The patient will receive B12 supplementation.  He is on gabapentin 200 mg 3 times daily and reported some improvement in his condition.  In the future additional diagnostics and treatments will depend on his clinical course.      Sincerely,  Ceasar French M.D.

## 2019-04-24 ENCOUNTER — OFFICE VISIT (OUTPATIENT)
Dept: PRIMARY CARE | Facility: CLINIC | Age: 69
End: 2019-04-24
Payer: COMMERCIAL

## 2019-04-24 VITALS
BODY MASS INDEX: 27.9 KG/M2 | TEMPERATURE: 97.8 F | HEART RATE: 85 BPM | RESPIRATION RATE: 16 BRPM | OXYGEN SATURATION: 96 % | HEIGHT: 72 IN | SYSTOLIC BLOOD PRESSURE: 120 MMHG | DIASTOLIC BLOOD PRESSURE: 72 MMHG | WEIGHT: 206 LBS

## 2019-04-24 DIAGNOSIS — G62.9 NEUROPATHY: Primary | ICD-10-CM

## 2019-04-24 DIAGNOSIS — E53.8 B12 DEFICIENCY: ICD-10-CM

## 2019-04-24 PROCEDURE — 90471 IMMUNIZATION ADMIN: CPT | Performed by: INTERNAL MEDICINE

## 2019-04-24 PROCEDURE — 99213 OFFICE O/P EST LOW 20 MIN: CPT | Mod: 25 | Performed by: INTERNAL MEDICINE

## 2019-04-24 PROCEDURE — 96372 THER/PROPH/DIAG INJ SC/IM: CPT | Performed by: INTERNAL MEDICINE

## 2019-04-24 PROCEDURE — 90732 PPSV23 VACC 2 YRS+ SUBQ/IM: CPT | Performed by: INTERNAL MEDICINE

## 2019-04-24 RX ORDER — GABAPENTIN 300 MG/1
300 CAPSULE ORAL 3 TIMES DAILY
Qty: 90 CAPSULE | Refills: 3 | Status: SHIPPED | OUTPATIENT
Start: 2019-04-24 | End: 2019-08-14 | Stop reason: SDUPTHER

## 2019-04-24 RX ORDER — CYANOCOBALAMIN 1000 UG/ML
1000 INJECTION, SOLUTION INTRAMUSCULAR; SUBCUTANEOUS ONCE
Status: COMPLETED | OUTPATIENT
Start: 2019-04-24 | End: 2019-04-24

## 2019-04-24 RX ADMIN — CYANOCOBALAMIN 1000 MCG: 1000 INJECTION, SOLUTION INTRAMUSCULAR; SUBCUTANEOUS at 10:58

## 2019-04-24 ASSESSMENT — ENCOUNTER SYMPTOMS
NAUSEA: 0
ARTHRALGIAS: 0
SHORTNESS OF BREATH: 0
BACK PAIN: 0
NECK PAIN: 0
LIGHT-HEADEDNESS: 0
FATIGUE: 0
DIZZINESS: 0
VOMITING: 0

## 2019-04-24 NOTE — PROGRESS NOTES
Subjective      Patient ID: Dannie Hardin is a 69 y.o. male.    He is here for follow-up and to address a few issues  1.  Ongoing neuropathic symptoms  2.  Review of correspondences and test results  3.  Concerns about immunizations        The following have been reviewed and updated as appropriate in this visit:       Review of Systems   Constitutional: Negative for fatigue.   Respiratory: Negative for shortness of breath.    Cardiovascular: Negative for chest pain.   Gastrointestinal: Negative for nausea and vomiting.   Musculoskeletal: Negative for arthralgias, back pain and neck pain.   Neurological: Negative for dizziness and light-headedness.        Neuropathy in feet   viable    Objective     Physical Exam   Constitutional: He appears well-developed and well-nourished.   HENT:   Head: Normocephalic and atraumatic.   Eyes: Pupils are equal, round, and reactive to light. Conjunctivae are normal.   Neck: Normal range of motion.   Cardiovascular: Normal rate and regular rhythm.    Pulmonary/Chest: Effort normal and breath sounds normal.   Musculoskeletal: Normal range of motion. He exhibits no edema.   Neurological:   ? Tongue deviation to R   Vitals reviewed.      Assessment/Plan   Problem List Items Addressed This Visit     Neuropathy - Primary     Comments provided by neurology are reviewed.  It is unclear exactly what the etiology is but given the fact that his B12 levels borderline low he may well benefit from B12 injections which will be initiated today.  We will continue to monitor his symptoms going forward and note that all other lab tests associated with this diagnosis were on

## 2019-04-24 NOTE — ASSESSMENT & PLAN NOTE
Comments provided by neurology are reviewed.  It is unclear exactly what the etiology is but given the fact that his B12 levels borderline low he may well benefit from B12 injections which will be initiated today.  We will continue to monitor his symptoms going forward and note that all other lab tests associated with this diagnosis were on

## 2019-04-30 ENCOUNTER — CLINICAL SUPPORT (OUTPATIENT)
Dept: PRIMARY CARE | Facility: CLINIC | Age: 69
End: 2019-04-30
Payer: COMMERCIAL

## 2019-04-30 DIAGNOSIS — E53.8 B12 DEFICIENCY: Primary | ICD-10-CM

## 2019-04-30 PROCEDURE — 96372 THER/PROPH/DIAG INJ SC/IM: CPT | Performed by: INTERNAL MEDICINE

## 2019-04-30 RX ORDER — CYANOCOBALAMIN 1000 UG/ML
1000 INJECTION, SOLUTION INTRAMUSCULAR; SUBCUTANEOUS ONCE
Status: COMPLETED | OUTPATIENT
Start: 2019-04-30 | End: 2019-04-30

## 2019-04-30 RX ADMIN — CYANOCOBALAMIN 1000 MCG: 1000 INJECTION, SOLUTION INTRAMUSCULAR; SUBCUTANEOUS at 15:32

## 2019-05-07 ENCOUNTER — CLINICAL SUPPORT (OUTPATIENT)
Dept: PRIMARY CARE | Facility: CLINIC | Age: 69
End: 2019-05-07
Payer: COMMERCIAL

## 2019-05-07 DIAGNOSIS — E53.8 VITAMIN B 12 DEFICIENCY: Primary | ICD-10-CM

## 2019-05-07 PROCEDURE — 96372 THER/PROPH/DIAG INJ SC/IM: CPT | Performed by: INTERNAL MEDICINE

## 2019-05-07 RX ORDER — CYANOCOBALAMIN 1000 UG/ML
1000 INJECTION, SOLUTION INTRAMUSCULAR; SUBCUTANEOUS ONCE
Status: COMPLETED | OUTPATIENT
Start: 2019-05-07 | End: 2019-05-07

## 2019-05-07 RX ADMIN — CYANOCOBALAMIN 1000 MCG: 1000 INJECTION, SOLUTION INTRAMUSCULAR; SUBCUTANEOUS at 15:10

## 2019-05-14 ENCOUNTER — CLINICAL SUPPORT (OUTPATIENT)
Dept: PRIMARY CARE | Facility: CLINIC | Age: 69
End: 2019-05-14
Payer: COMMERCIAL

## 2019-05-14 DIAGNOSIS — E53.8 VITAMIN B 12 DEFICIENCY: Primary | ICD-10-CM

## 2019-05-14 PROCEDURE — 96372 THER/PROPH/DIAG INJ SC/IM: CPT | Performed by: INTERNAL MEDICINE

## 2019-05-14 RX ORDER — CYANOCOBALAMIN 1000 UG/ML
1000 INJECTION, SOLUTION INTRAMUSCULAR; SUBCUTANEOUS ONCE
Status: COMPLETED | OUTPATIENT
Start: 2019-05-14 | End: 2019-05-14

## 2019-05-14 RX ADMIN — CYANOCOBALAMIN 1000 MCG: 1000 INJECTION, SOLUTION INTRAMUSCULAR; SUBCUTANEOUS at 15:14

## 2019-05-21 ENCOUNTER — CLINICAL SUPPORT (OUTPATIENT)
Dept: PRIMARY CARE | Facility: CLINIC | Age: 69
End: 2019-05-21
Payer: COMMERCIAL

## 2019-05-21 DIAGNOSIS — E53.8 VITAMIN B12 DEFICIENCY: Primary | ICD-10-CM

## 2019-05-21 PROCEDURE — 96372 THER/PROPH/DIAG INJ SC/IM: CPT | Performed by: INTERNAL MEDICINE

## 2019-05-21 RX ORDER — CYANOCOBALAMIN 1000 UG/ML
1000 INJECTION, SOLUTION INTRAMUSCULAR; SUBCUTANEOUS ONCE
Status: COMPLETED | OUTPATIENT
Start: 2019-05-21 | End: 2019-05-21

## 2019-05-21 RX ADMIN — CYANOCOBALAMIN 1000 MCG: 1000 INJECTION, SOLUTION INTRAMUSCULAR; SUBCUTANEOUS at 15:12

## 2019-05-28 ENCOUNTER — CLINICAL SUPPORT (OUTPATIENT)
Dept: PRIMARY CARE | Facility: CLINIC | Age: 69
End: 2019-05-28
Payer: COMMERCIAL

## 2019-05-28 DIAGNOSIS — E53.8 B12 DEFICIENCY: Primary | ICD-10-CM

## 2019-05-28 PROCEDURE — 96372 THER/PROPH/DIAG INJ SC/IM: CPT | Performed by: INTERNAL MEDICINE

## 2019-05-28 RX ORDER — CYANOCOBALAMIN 1000 UG/ML
1000 INJECTION, SOLUTION INTRAMUSCULAR; SUBCUTANEOUS ONCE
Status: COMPLETED | OUTPATIENT
Start: 2019-05-28 | End: 2019-05-28

## 2019-05-28 RX ADMIN — CYANOCOBALAMIN 1000 MCG: 1000 INJECTION, SOLUTION INTRAMUSCULAR; SUBCUTANEOUS at 15:06

## 2019-06-04 ENCOUNTER — CLINICAL SUPPORT (OUTPATIENT)
Dept: PRIMARY CARE | Facility: CLINIC | Age: 69
End: 2019-06-04
Payer: COMMERCIAL

## 2019-06-04 DIAGNOSIS — E53.8 B12 DEFICIENCY: Primary | ICD-10-CM

## 2019-06-04 PROCEDURE — 96372 THER/PROPH/DIAG INJ SC/IM: CPT | Performed by: INTERNAL MEDICINE

## 2019-06-04 RX ORDER — CYANOCOBALAMIN 1000 UG/ML
1000 INJECTION, SOLUTION INTRAMUSCULAR; SUBCUTANEOUS ONCE
Status: COMPLETED | OUTPATIENT
Start: 2019-06-04 | End: 2019-06-04

## 2019-06-04 RX ADMIN — CYANOCOBALAMIN 1000 MCG: 1000 INJECTION, SOLUTION INTRAMUSCULAR; SUBCUTANEOUS at 15:15

## 2019-06-11 ENCOUNTER — CLINICAL SUPPORT (OUTPATIENT)
Dept: PRIMARY CARE | Facility: CLINIC | Age: 69
End: 2019-06-11
Payer: COMMERCIAL

## 2019-06-11 DIAGNOSIS — E53.8 VITAMIN B 12 DEFICIENCY: Primary | ICD-10-CM

## 2019-06-11 PROCEDURE — 96372 THER/PROPH/DIAG INJ SC/IM: CPT | Performed by: INTERNAL MEDICINE

## 2019-06-11 RX ORDER — CYANOCOBALAMIN 1000 UG/ML
1000 INJECTION, SOLUTION INTRAMUSCULAR; SUBCUTANEOUS ONCE
Status: COMPLETED | OUTPATIENT
Start: 2019-06-11 | End: 2019-06-11

## 2019-06-11 RX ADMIN — CYANOCOBALAMIN 1000 MCG: 1000 INJECTION, SOLUTION INTRAMUSCULAR; SUBCUTANEOUS at 15:13

## 2019-07-09 ENCOUNTER — CLINICAL SUPPORT (OUTPATIENT)
Dept: PRIMARY CARE | Facility: CLINIC | Age: 69
End: 2019-07-09
Payer: COMMERCIAL

## 2019-07-09 DIAGNOSIS — E53.8 B12 DEFICIENCY: Primary | ICD-10-CM

## 2019-07-09 RX ORDER — CYANOCOBALAMIN 1000 UG/ML
1000 INJECTION, SOLUTION INTRAMUSCULAR; SUBCUTANEOUS ONCE
Status: COMPLETED | OUTPATIENT
Start: 2019-07-09 | End: 2019-07-09

## 2019-07-09 RX ADMIN — CYANOCOBALAMIN 1000 MCG: 1000 INJECTION, SOLUTION INTRAMUSCULAR; SUBCUTANEOUS at 12:09

## 2019-07-18 ENCOUNTER — TELEPHONE (OUTPATIENT)
Dept: PRIMARY CARE | Facility: CLINIC | Age: 69
End: 2019-07-18

## 2019-07-18 DIAGNOSIS — Z12.5 SCREENING FOR PROSTATE CANCER: ICD-10-CM

## 2019-07-18 DIAGNOSIS — Z00.00 ENCOUNTER FOR GENERAL ADULT MEDICAL EXAMINATION WITHOUT ABNORMAL FINDINGS: Primary | ICD-10-CM

## 2019-08-02 LAB
ALBUMIN SERPL-MCNC: 4.3 G/DL (ref 3.6–4.8)
ALBUMIN/GLOB SERPL: 1.5 {RATIO} (ref 1.2–2.2)
ALP SERPL-CCNC: 88 IU/L (ref 39–117)
ALT SERPL-CCNC: 25 IU/L (ref 0–44)
AST SERPL-CCNC: 24 IU/L (ref 0–40)
BASOPHILS # BLD AUTO: 0 X10E3/UL (ref 0–0.2)
BASOPHILS NFR BLD AUTO: 0 %
BILIRUB SERPL-MCNC: 1.7 MG/DL (ref 0–1.2)
BUN SERPL-MCNC: 21 MG/DL (ref 8–27)
BUN/CREAT SERPL: 21 (ref 10–24)
CALCIUM SERPL-MCNC: 9.6 MG/DL (ref 8.6–10.2)
CHLORIDE SERPL-SCNC: 103 MMOL/L (ref 96–106)
CHOLEST SERPL-MCNC: 176 MG/DL (ref 100–199)
CO2 SERPL-SCNC: 25 MMOL/L (ref 20–29)
CREAT SERPL-MCNC: 1.02 MG/DL (ref 0.76–1.27)
EOSINOPHIL # BLD AUTO: 0.2 X10E3/UL (ref 0–0.4)
EOSINOPHIL NFR BLD AUTO: 3 %
ERYTHROCYTE [DISTWIDTH] IN BLOOD BY AUTOMATED COUNT: 14.5 % (ref 12.3–15.4)
GLOBULIN SER CALC-MCNC: 2.8 G/DL (ref 1.5–4.5)
GLUCOSE SERPL-MCNC: 100 MG/DL (ref 65–99)
HBA1C MFR BLD: 5.4 % (ref 4.8–5.6)
HCT VFR BLD AUTO: 44.5 % (ref 37.5–51)
HDLC SERPL-MCNC: 41 MG/DL
HGB BLD-MCNC: 14.6 G/DL (ref 13–17.7)
IMM GRANULOCYTES # BLD AUTO: 0 X10E3/UL (ref 0–0.1)
IMM GRANULOCYTES NFR BLD AUTO: 0 %
LAB CORP EGFR IF AFRICN AM: 86 ML/MIN/1.73
LAB CORP EGFR IF NONAFRICN AM: 75 ML/MIN/1.73
LDLC SERPL CALC-MCNC: 104 MG/DL (ref 0–99)
LYMPHOCYTES # BLD AUTO: 1.3 X10E3/UL (ref 0.7–3.1)
LYMPHOCYTES NFR BLD AUTO: 24 %
MCH RBC QN AUTO: 27.7 PG (ref 26.6–33)
MCHC RBC AUTO-ENTMCNC: 32.8 G/DL (ref 31.5–35.7)
MCV RBC AUTO: 84 FL (ref 79–97)
MONOCYTES # BLD AUTO: 0.4 X10E3/UL (ref 0.1–0.9)
MONOCYTES NFR BLD AUTO: 7 %
NEUTROPHILS # BLD AUTO: 3.6 X10E3/UL (ref 1.4–7)
NEUTROPHILS NFR BLD AUTO: 66 %
PLATELET # BLD AUTO: 220 X10E3/UL (ref 150–450)
POTASSIUM SERPL-SCNC: 4.7 MMOL/L (ref 3.5–5.2)
PROT SERPL-MCNC: 7.1 G/DL (ref 6–8.5)
PSA SERPL-MCNC: 0.2 NG/ML (ref 0–4)
RBC # BLD AUTO: 5.28 X10E6/UL (ref 4.14–5.8)
SODIUM SERPL-SCNC: 140 MMOL/L (ref 134–144)
TRIGL SERPL-MCNC: 154 MG/DL (ref 0–149)
VLDLC SERPL CALC-MCNC: 31 MG/DL (ref 5–40)
WBC # BLD AUTO: 5.4 X10E3/UL (ref 3.4–10.8)

## 2019-08-14 ENCOUNTER — OFFICE VISIT (OUTPATIENT)
Dept: PRIMARY CARE | Facility: CLINIC | Age: 69
End: 2019-08-14
Payer: COMMERCIAL

## 2019-08-14 VITALS
HEIGHT: 72 IN | OXYGEN SATURATION: 97 % | WEIGHT: 202 LBS | DIASTOLIC BLOOD PRESSURE: 74 MMHG | SYSTOLIC BLOOD PRESSURE: 126 MMHG | HEART RATE: 80 BPM | TEMPERATURE: 97.9 F | RESPIRATION RATE: 17 BRPM | BODY MASS INDEX: 27.36 KG/M2

## 2019-08-14 DIAGNOSIS — Z00.00 ENCOUNTER FOR GENERAL ADULT MEDICAL EXAMINATION WITHOUT ABNORMAL FINDINGS: Primary | ICD-10-CM

## 2019-08-14 DIAGNOSIS — E53.8 VITAMIN B12 DEFICIENCY: ICD-10-CM

## 2019-08-14 PROCEDURE — 99397 PER PM REEVAL EST PAT 65+ YR: CPT | Performed by: INTERNAL MEDICINE

## 2019-08-14 RX ORDER — FLUTICASONE PROPIONATE 50 MCG
1 SPRAY, SUSPENSION (ML) NASAL DAILY
Qty: 1 BOTTLE | Refills: 3 | Status: SHIPPED | OUTPATIENT
Start: 2019-08-14 | End: 2019-12-21 | Stop reason: SDUPTHER

## 2019-08-14 RX ORDER — GABAPENTIN 300 MG/1
300 CAPSULE ORAL 3 TIMES DAILY
Qty: 90 CAPSULE | Refills: 3 | Status: SHIPPED | OUTPATIENT
Start: 2019-08-14 | End: 2019-12-17

## 2019-08-14 ASSESSMENT — ENCOUNTER SYMPTOMS
WEAKNESS: 0
TREMORS: 0
ABDOMINAL DISTENTION: 0
ENDOCRINE NEGATIVE: 1
DIARRHEA: 0
NERVOUS/ANXIOUS: 0
WHEEZING: 0
SHORTNESS OF BREATH: 0
ARTHRALGIAS: 0
CONSTITUTIONAL NEGATIVE: 1
NECK PAIN: 0
DYSURIA: 0
SORE THROAT: 0
ADENOPATHY: 0
BRUISES/BLEEDS EASILY: 0
FREQUENCY: 0
COUGH: 0
CARDIOVASCULAR NEGATIVE: 1
SEIZURES: 0
LIGHT-HEADEDNESS: 0
HEADACHES: 0
WOUND: 0
HEMATURIA: 0
SLEEP DISTURBANCE: 0
BLOOD IN STOOL: 0
NUMBNESS: 0
NAUSEA: 0
CONFUSION: 0
VOMITING: 0
ABDOMINAL PAIN: 0
BACK PAIN: 0
TROUBLE SWALLOWING: 0

## 2019-08-14 NOTE — PROGRESS NOTES
Subjective      Patient ID: Dannie Hardin is a 69 y.o. male.    HPI    The following have been reviewed and updated as appropriate in this visit:       Review of Systems   Constitutional: Negative.    HENT: Negative for congestion, hearing loss, nosebleeds, sore throat, tinnitus and trouble swallowing.    Eyes: Negative for visual disturbance.   Respiratory: Negative for cough, shortness of breath and wheezing.    Cardiovascular: Negative.    Gastrointestinal: Negative for abdominal distention, abdominal pain, blood in stool, diarrhea, nausea and vomiting.   Endocrine: Negative.    Genitourinary: Negative for dysuria, frequency, hematuria, scrotal swelling, testicular pain and urgency.        No nocturia   Musculoskeletal: Negative for arthralgias, back pain and neck pain.   Skin: Negative for rash and wound.   Allergic/Immunologic: Negative for environmental allergies.   Neurological: Negative for tremors, seizures, syncope, weakness, light-headedness, numbness and headaches.        Neuropathy, kris in feet   Hematological: Negative for adenopathy. Does not bruise/bleed easily.   Psychiatric/Behavioral: Negative for confusion and sleep disturbance. The patient is not nervous/anxious.    viable    Objective     Physical Exam   Constitutional: He appears well-developed and well-nourished.   HENT:   Head: Normocephalic and atraumatic.   Eyes: Pupils are equal, round, and reactive to light. Conjunctivae are normal.   Neck: Normal range of motion. No JVD present.   Cardiovascular: Normal rate and regular rhythm.    No murmur heard.  Pulmonary/Chest: Effort normal and breath sounds normal. No respiratory distress. He has no wheezes. He has no rales.   Abdominal: Soft. He exhibits no distension. There is no tenderness.   Musculoskeletal: Normal range of motion. He exhibits no edema.   Neurological: He is alert. No cranial nerve deficit.   Skin: Skin is warm.   Psychiatric: He has a normal mood and affect. His behavior is  normal.   Vitals reviewed.      Assessment/Plan   Problem List Items Addressed This Visit     Encounter for general adult medical examination without abnormal findings - Primary     Screening blood work reviewed, no abnormalities seen.  His PSA is also within normal limits.  Most recent colonoscopy was performed about 5 years ago was completely normal and he will not need a repeat one for another 5 years         Vitamin B12 deficiency     This was noted to be borderline low several months ago he stopped getting them feeling that they were not making a difference in his last B12 injection was about 6 weeks ago.  On recheck his level and plan accordingly.  Note that he has a peripheral neuropathy but again this did not seem to alleviate his symptoms in any meaningful way         Relevant Orders    Vitamin B12

## 2019-08-14 NOTE — ASSESSMENT & PLAN NOTE
This was noted to be borderline low several months ago he stopped getting them feeling that they were not making a difference in his last B12 injection was about 6 weeks ago.  On recheck his level and plan accordingly.  Note that he has a peripheral neuropathy but again this did not seem to alleviate his symptoms in any meaningful way

## 2019-08-14 NOTE — ASSESSMENT & PLAN NOTE
Screening blood work reviewed, no abnormalities seen.  His PSA is also within normal limits.  Most recent colonoscopy was performed about 5 years ago was completely normal and he will not need a repeat one for another 5 years

## 2019-08-22 LAB — VIT B12 SERPL-MCNC: 324 PG/ML (ref 232–1245)

## 2019-08-26 ENCOUNTER — TELEPHONE (OUTPATIENT)
Dept: PRIMARY CARE | Facility: CLINIC | Age: 69
End: 2019-08-26

## 2019-08-26 NOTE — TELEPHONE ENCOUNTER
----- Message from Edenilson Haile MD sent at 8/26/2019  2:20 PM EDT -----  Please let him know that his B12 level at 324 is in the low end of normal.  He probably would benefit from B12 injections on a once monthly basis only going forward

## 2019-08-28 ENCOUNTER — TELEPHONE (OUTPATIENT)
Dept: PRIMARY CARE | Facility: CLINIC | Age: 69
End: 2019-08-28

## 2019-08-29 ENCOUNTER — TELEPHONE (OUTPATIENT)
Dept: PRIMARY CARE | Facility: CLINIC | Age: 69
End: 2019-08-29

## 2019-08-29 NOTE — TELEPHONE ENCOUNTER
----- Message from Dannie Hardin sent at 8/28/2019  7:37 PM EDT -----  Regarding: Test Results Question  Contact: [phone]  Received call from Dimas today to review my latest B12 test result. My understanding is Dr Haile wants me to continue the monthly B12 injections.  Told Dimas that doing the shots in the office is costly and inconvenient. He mentioned that self administered or having it done at a retail clinic is also possible. Would prefer to self administer. Does a script need to be called in? Not sure if this would be covered by insurance.  Or is there a certain B12 oral OTC that can be recommended.   thanks   Dannie Hardin   [birth year]

## 2019-09-03 NOTE — TELEPHONE ENCOUNTER
I am perfectly flexible with what he wants.  When he get a chance please reach out to him to see what we can do.  If there is an OTC available or a p.o. version which is covered that would be fine

## 2019-09-04 ENCOUNTER — TELEPHONE (OUTPATIENT)
Dept: PRIMARY CARE | Facility: CLINIC | Age: 69
End: 2019-09-04

## 2019-09-16 ENCOUNTER — OFFICE VISIT (OUTPATIENT)
Dept: NEUROLOGY | Facility: CLINIC | Age: 69
End: 2019-09-16
Payer: COMMERCIAL

## 2019-09-16 VITALS — SYSTOLIC BLOOD PRESSURE: 116 MMHG | DIASTOLIC BLOOD PRESSURE: 62 MMHG | RESPIRATION RATE: 16 BRPM | HEART RATE: 80 BPM

## 2019-09-16 DIAGNOSIS — R20.0 NUMBNESS: ICD-10-CM

## 2019-09-16 DIAGNOSIS — R52 PAIN: Primary | ICD-10-CM

## 2019-09-16 PROCEDURE — 99213 OFFICE O/P EST LOW 20 MIN: CPT | Performed by: PSYCHIATRY & NEUROLOGY

## 2019-09-16 RX ORDER — GABAPENTIN 600 MG/1
600 TABLET ORAL 3 TIMES DAILY
Qty: 90 TABLET | Refills: 3 | Status: SHIPPED | OUTPATIENT
Start: 2019-09-16 | End: 2019-12-17 | Stop reason: SDUPTHER

## 2019-09-16 NOTE — PROGRESS NOTES
Dannie Hardin is a 69 y.o. male  9/16/2019  Edenilson Haile MD    Neurology Follow Up Note    Subjective     Dannie Hardin is a 69 y.o. male who is being evaluated  for foot symptoms.  I previously saw the patient about 5 months ago.  At that time an EMG was unremarkable.  B12 was low and the patient received supplementation.  I recommended gabapentin for a possible small fiber polyneuropathy.    Since his last visit, overall the patient reported that he is frustrated.  He reported that he has both good days and bad days.  On a good day he has mostly numbness and paresthesias without significant dysesthesias.  On a bad day he has more pain in his feet right greater than left.  His symptoms clearly affect his quality of life.  He has been unable to identify a trigger for his pain or factors that make it better or worse and thankfully he has a high pain threshold.    The patient wonders whether his condition is related to a neurologic issue or a musculoskeletal issue.  He has been evaluated by podiatry and orthopedist who have been unable to help him with his condition.  He was told that he had a pre-stress fracture and then a stress fracture which has not healed properly.  He is on gabapentin 300 mg 3 times daily and is tolerating the medication well without any side effects.  He is receiving B12 supplementation and a B12 level was recently 324.    Detailed neurologic and medical review of systems was otherwise unremarkable.  There were no symptoms to suggest increased intracranial pressure, meningitis or systemic illness.  In general the patient eats and sleeps well and his mood is good.     Review of Systems  Constitutional: negative  Eyes: negative  Ears, nose, mouth, throat, and face: negative  Respiratory: negative  Cardiovascular: negative  Gastrointestinal: negative  Genitourinary:negative  Integument/breast: negative  Hematologic/lymphatic: negative  Musculoskeletal:negative  Neurological:  negative  Behavioral/Psych: negative  Endocrine: negative  Allergic/Immunologic: negative    Current Outpatient Prescriptions   Medication Sig Dispense Refill   • gabapentin (NEURONTIN) 300 mg capsule Take 1 capsule (300 mg total) by mouth 3 (three) times a day. 90 capsule 3   • ascorbic acid, vitamin C, (vitamin C) 100 mg tablet Take 100 mg by mouth daily.     • B-complex with vitamin C tablet Take 1 tablet by mouth daily.     • cholecalciferol, vitamin D3, (VITAMIN D3) 1,000 unit capsule Take 1,000 Units by mouth daily.     • chromium picolinate 1,000 mcg tablet Take by mouth.     • fluticasone propionate (FLONASE) 50 mcg/actuation nasal spray Administer 1 spray into each nostril daily. 1 Bottle 3   • gabapentin (NEURONTIN) 600 mg tablet Take 1 tablet (600 mg total) by mouth 3 (three) times a day. 90 tablet 3   • garlic 1 mg capsule Take by mouth. 6 capsules daily      • glucosamine sulfate (GLUCOSAMINE) 500 mg tablet Take by mouth.     • lactobacillus 3/FOS/pantethine (PROBIOTIC AND ACIDOPHILUS ORAL) Take by mouth.     • magnesium 30 mg tablet Take 30 mg by mouth 2 (two) times a day.     • multivitamin (THERAGRAN) tablet take 1 tablet by oral route  every day with food     • niacin 500 mg tablet Take 500 mg by mouth daily with breakfast.     • omega 3-dha-epa-fish oil (FISH OIL) 138-183-1,000 mg capsule Take by mouth.     • sildenafil (VIAGRA) 100 mg tablet Take 1 tablet (100 mg total) by mouth daily as needed for erectile dysfunction. 10 tablet 5   • thiamine (vitamin B-1) 100 mg tablet Take 100 mg by mouth daily.     • vitamin E acetate (VITAMIN E ORAL) Take 400 Units by mouth 4 (four) times a day.         No current facility-administered medications for this visit.        PMH/SH/FH : Unchanged since previous visit.    Objective     Physical Exam  /62   Pulse 80   Resp 16     General Appearance:  Alert, no distress, appears stated age     Data Reviewed:  A recent B12 level was 324    Problem List Items  Addressed This Visit     Pain - Primary    Current Assessment & Plan     It does sound as though the patient has a component of neuropathic pain.  For that reason I recommended that he gradually increase his gabapentin as tolerated.  If ineffective, in the future I would consider initiating treatment with Lyrica or Cymbalta.    It is also possible that the patient has musculoskeletal pain.  He will get another opinion with another podiatrist.  Additional diagnostics and treatments will depend on his clinical course.         Numbness    Current Assessment & Plan     The patient reported numbness.  A comprehensive work-up was only notable for a slightly low B12 level and he is receiving supplementation.  He may have a small fiber polyneuropathy.               It was a real pleasure treating Dannie DOMINIK Wilfred today, thank you for allowing me to participate in the medical care. If you have any questions, please call me at any time. Dannie Hardin will follow up with me in the coming weeks to months and keep me updated by telephone. Dannie Hardin knows to notify me immediately if there is any change in the condition or if there are any new symptoms of transient or static neurologic dysfunction.    Ceasar French MD

## 2019-09-16 NOTE — LETTER
September 16, 2019     Edenilson Haile MD  [address]  [address]  [facility] [place] [address]    Patient: Dannie Hardin  YOB: 1950  Date of Visit: 9/16/2019      Dear Dr. Haile:    Thank you for referring Dannie Hardin to me for evaluation. Below are my notes for this consultation.    If you have questions, please do not hesitate to call me. I look forward to following your patient along with you.         Sincerely,        Ceasar French MD        CC: No Recipients  Ceasar French MD  9/16/2019  9:31 AM  Signed  Dannie Hardin is a 69 y.o. male  9/16/2019  Edenilson Haile MD    Neurology Follow Up Note    Subjective     Dannie Hardin is a 69 y.o. male who is being evaluated  for foot symptoms.  I previously saw the patient about 5 months ago.  At that time an EMG was unremarkable.  B12 was low and the patient received supplementation.  I recommended gabapentin for a possible small fiber polyneuropathy.    Since his last visit, overall the patient reported that he is frustrated.  He reported that he has both good days and bad days.  On a good day he has mostly numbness and paresthesias without significant dysesthesias.  On a bad day he has more pain in his feet right greater than left.  His symptoms clearly affect his quality of life.  He has been unable to identify a trigger for his pain or factors that make it better or worse and thankfully he has a high pain threshold.    The patient wonders whether his condition is related to a neurologic issue or a musculoskeletal issue.  He has been evaluated by podiatry and orthopedist who have been unable to help him with his condition.  He was told that he had a pre-stress fracture and then a stress fracture which has not healed properly.  He is on gabapentin 300 mg 3 times daily and is tolerating the medication well without any side effects.  He is receiving B12 supplementation and a B12 level was recently 324.    Detailed neurologic and  medical review of systems was otherwise unremarkable.  There were no symptoms to suggest increased intracranial pressure, meningitis or systemic illness.  In general the patient eats and sleeps well and his mood is good.     Review of Systems  Constitutional: negative  Eyes: negative  Ears, nose, mouth, throat, and face: negative  Respiratory: negative  Cardiovascular: negative  Gastrointestinal: negative  Genitourinary:negative  Integument/breast: negative  Hematologic/lymphatic: negative  Musculoskeletal:negative  Neurological: negative  Behavioral/Psych: negative  Endocrine: negative  Allergic/Immunologic: negative    Current Outpatient Prescriptions   Medication Sig Dispense Refill   • gabapentin (NEURONTIN) 300 mg capsule Take 1 capsule (300 mg total) by mouth 3 (three) times a day. 90 capsule 3   • ascorbic acid, vitamin C, (vitamin C) 100 mg tablet Take 100 mg by mouth daily.     • B-complex with vitamin C tablet Take 1 tablet by mouth daily.     • cholecalciferol, vitamin D3, (VITAMIN D3) 1,000 unit capsule Take 1,000 Units by mouth daily.     • chromium picolinate 1,000 mcg tablet Take by mouth.     • fluticasone propionate (FLONASE) 50 mcg/actuation nasal spray Administer 1 spray into each nostril daily. 1 Bottle 3   • gabapentin (NEURONTIN) 600 mg tablet Take 1 tablet (600 mg total) by mouth 3 (three) times a day. 90 tablet 3   • garlic 1 mg capsule Take by mouth. 6 capsules daily      • glucosamine sulfate (GLUCOSAMINE) 500 mg tablet Take by mouth.     • lactobacillus 3/FOS/pantethine (PROBIOTIC AND ACIDOPHILUS ORAL) Take by mouth.     • magnesium 30 mg tablet Take 30 mg by mouth 2 (two) times a day.     • multivitamin (THERAGRAN) tablet take 1 tablet by oral route  every day with food     • niacin 500 mg tablet Take 500 mg by mouth daily with breakfast.     • omega 3-dha-epa-fish oil (FISH OIL) 138-183-1,000 mg capsule Take by mouth.     • sildenafil (VIAGRA) 100 mg tablet Take 1 tablet (100 mg total)  by mouth daily as needed for erectile dysfunction. 10 tablet 5   • thiamine (vitamin B-1) 100 mg tablet Take 100 mg by mouth daily.     • vitamin E acetate (VITAMIN E ORAL) Take 400 Units by mouth 4 (four) times a day.         No current facility-administered medications for this visit.        PMH/SH/FH : Unchanged since previous visit.    Objective     Physical Exam  /62   Pulse 80   Resp 16     General Appearance:  Alert, no distress, appears stated age     Data Reviewed:  A recent B12 level was 324    Problem List Items Addressed This Visit     Pain - Primary    Current Assessment & Plan     It does sound as though the patient has a component of neuropathic pain.  For that reason I recommended that he gradually increase his gabapentin as tolerated.  If ineffective, in the future I would consider initiating treatment with Lyrica or Cymbalta.    It is also possible that the patient has musculoskeletal pain.  He will get another opinion with another podiatrist.  Additional diagnostics and treatments will depend on his clinical course.         Numbness    Current Assessment & Plan     The patient reported numbness.  A comprehensive work-up was only notable for a slightly low B12 level and he is receiving supplementation.  He may have a small fiber polyneuropathy.               It was a real pleasure treating Dannie Hardin today, thank you for allowing me to participate in the medical care. If you have any questions, please call me at any time. Dannie Hardin will follow up with me in the coming weeks to months and keep me updated by telephone. Dannie Hardin knows to notify me immediately if there is any change in the condition or if there are any new symptoms of transient or static neurologic dysfunction.    Ceasar French MD

## 2019-09-16 NOTE — ASSESSMENT & PLAN NOTE
It does sound as though the patient has a component of neuropathic pain.  For that reason I recommended that he gradually increase his gabapentin as tolerated.  If ineffective, in the future I would consider initiating treatment with Lyrica or Cymbalta.    It is also possible that the patient has musculoskeletal pain.  He will get another opinion with another podiatrist.  Additional diagnostics and treatments will depend on his clinical course.

## 2019-09-16 NOTE — ASSESSMENT & PLAN NOTE
The patient reported numbness.  A comprehensive work-up was only notable for a slightly low B12 level and he is receiving supplementation.  He may have a small fiber polyneuropathy.

## 2019-10-21 PROBLEM — F14.11 HISTORY OF COCAINE ABUSE (CMS/HCC): Status: ACTIVE | Noted: 2019-10-21

## 2019-12-17 ENCOUNTER — OFFICE VISIT (OUTPATIENT)
Dept: NEUROLOGY | Facility: CLINIC | Age: 69
End: 2019-12-17
Payer: COMMERCIAL

## 2019-12-17 VITALS — RESPIRATION RATE: 15 BRPM | HEART RATE: 85 BPM | DIASTOLIC BLOOD PRESSURE: 70 MMHG | SYSTOLIC BLOOD PRESSURE: 118 MMHG

## 2019-12-17 DIAGNOSIS — R52 PAIN: Primary | ICD-10-CM

## 2019-12-17 DIAGNOSIS — G62.9 NEUROPATHY: Primary | ICD-10-CM

## 2019-12-17 DIAGNOSIS — R20.0 NUMBNESS: ICD-10-CM

## 2019-12-17 PROCEDURE — 99213 OFFICE O/P EST LOW 20 MIN: CPT | Performed by: PSYCHIATRY & NEUROLOGY

## 2019-12-17 RX ORDER — GABAPENTIN 600 MG/1
600 TABLET ORAL 3 TIMES DAILY
Qty: 90 TABLET | Refills: 3 | Status: SHIPPED | OUTPATIENT
Start: 2019-12-17 | End: 2020-04-23 | Stop reason: SDUPTHER

## 2019-12-17 NOTE — LETTER
December 17, 2019     Edenilson Haile MD  [address]  [address]  [facility] [address]    Patient: Dannie Hardin  YOB: 1950  Date of Visit: 12/17/2019      Dear Dr. Haile:    Thank you for referring Dannie Hardin to me for evaluation. Below are my notes for this consultation.    If you have questions, please do not hesitate to call me. I look forward to following your patient along with you.         Sincerely,        Ceasar French MD        CC: No Recipients  Ceasar French MD  12/17/2019  9:31 AM  Signed  Dannie Hardin is a 69 y.o. male  12/17/2019  Edenilson Haile MD    Neurology Follow Up Note    Subjective     Dannie Hardin is a 69 y.o. male who is being evaluated  for neuropathy.  At his last visit, the patient was doing relatively well and I recommended he gradually increase his gabapentin.    Since his last visit, overall the patient reported that he continues to do quite well.  He increased his dose of gabapentin to 600 mg 3 times daily.  He is tolerating the medication well without any side effects.  He reported that his neck, back and arms are completely unaffected.  For the most part his legs feel good as well.  He has some degree of soreness in his feet however does not have any neuropathic pain.  On a good day he is essentially asymptomatic.  On a bad day he has a little aching in his feet although has had no major numbness, paresthesias and certainly no painful dysesthesias.  Perhaps weather changes makes his condition worse.    The patient went on a trip to [place] and was able to stand and walk for long periods of time.  He works out at a gym on a minimum of 5 days a week.  He feels as though he is stronger with increasing weight and repetitions.  He joked that he feels like a kid.  He received some injection and now oral B12 supplementation.  Detailed neurologic and medical review of systems was otherwise unremarkable.  There were no symptoms to suggest  increased intracranial pressure, meningitis or systemic illness.    Review of Systems  Constitutional: negative  Eyes: negative  Ears, nose, mouth, throat, and face: negative  Respiratory: negative  Cardiovascular: negative  Gastrointestinal: negative  Genitourinary:negative  Integument/breast: negative  Hematologic/lymphatic: negative  Musculoskeletal:negative  Neurological: negative  Behavioral/Psych: negative  Endocrine: negative  Allergic/Immunologic: negative    Current Outpatient Medications   Medication Sig Dispense Refill   • ascorbic acid, vitamin C, (vitamin C) 100 mg tablet Take 100 mg by mouth daily.     • B-complex with vitamin C tablet Take 1 tablet by mouth daily.     • cholecalciferol, vitamin D3, (VITAMIN D3) 1,000 unit capsule Take 1,000 Units by mouth daily.     • chromium picolinate 1,000 mcg tablet Take by mouth.     • fluticasone propionate (FLONASE) 50 mcg/actuation nasal spray Administer 1 spray into each nostril daily. 1 Bottle 3   • garlic 1 mg capsule Take by mouth. 6 capsules daily      • glucosamine sulfate (GLUCOSAMINE) 500 mg tablet Take by mouth.     • lactobacillus 3/FOS/pantethine (PROBIOTIC AND ACIDOPHILUS ORAL) Take by mouth.     • magnesium 30 mg tablet Take 30 mg by mouth 2 (two) times a day.     • multivitamin (THERAGRAN) tablet take 1 tablet by oral route  every day with food     • niacin 500 mg tablet Take 500 mg by mouth daily with breakfast.     • omega 3-dha-epa-fish oil (FISH OIL) 138-183-1,000 mg capsule Take by mouth.     • thiamine (vitamin B-1) 100 mg tablet Take 100 mg by mouth daily.     • vitamin E acetate (VITAMIN E ORAL) Take 400 Units by mouth 4 (four) times a day.       • gabapentin (NEURONTIN) 600 mg tablet Take 1 tablet (600 mg total) by mouth 3 (three) times a day. 90 tablet 3   • sildenafil (VIAGRA) 100 mg tablet Take 1 tablet (100 mg total) by mouth daily as needed for erectile dysfunction. 10 tablet 5     No current facility-administered medications for  this visit.        PMH/SH/FH : Unchanged since previous visit.    Objective     Physical Exam  Visit Vitals  /70   Pulse 85   Resp 15       General Appearance:  Alert, no distress, appears stated age               Neurologic Exam:  Alert and oriented. Attention, concentration, memory, language, visual spatial orientation, executive function is normal.    Pupils equal round and reactive to light. Extraocular movement full with normal pursuit + saccades. No nystagmus noted.   Facial strength and sensation is normal. Hearing normal.  The tongue and uvula were midline. No dysarthria or dysphagia.   Strength was 5/5 in bulbar, axial + extremity muscles.There was normal bulk and tone with no abnormal movements.   The sensory examination was normal to touch, temperature and pain, vibration and proprioception. There was no dysmetria or cerebellar signs.   The gait was narrow based. Patient was able to tandem walk. Negative Romberg sign. Reflexes were ++ and symmetric. Ledesma sign was negative. Plantar responses were flexor.       Problem List Items Addressed This Visit        Nervous    Neuropathy - Primary    Current Assessment & Plan     The patient reported numbness.  A comprehensive work-up was only notable for a slightly low B12 level and he is receiving supplementation.  We will check a B12 level.  He may have a small fiber polyneuropathy.  On gabapentin he has absolutely no neuropathic pain and only reported some soreness in his feet.  He will continue gabapentin 600 mg 3 times daily.  I encouraged him to remain as physically and cognitively active as possible.  Additional diagnostics and treatments will depend on his clinical course and the results of his pending studies.         Numbness    Relevant Orders    Vitamin B12          It was a real pleasure treating Dannie Hardin today, thank you for allowing me to participate in the medical care. If you have any questions, please call me at any time. Dannie LEHMAN  Drain will follow up with me in the coming weeks to months and keep me updated by telephone. Dannie Hardin knows to notify me immediately if there is any change in the condition or if there are any new symptoms of transient or static neurologic dysfunction.    Ceasar French MD

## 2019-12-17 NOTE — PROGRESS NOTES
Dannie Hardin is a 69 y.o. male  12/17/2019  Edenilson Haile MD    Neurology Follow Up Note    Subjective     Dannie Hardin is a 69 y.o. male who is being evaluated  for neuropathy.  At his last visit, the patient was doing relatively well and I recommended he gradually increase his gabapentin.    Since his last visit, overall the patient reported that he continues to do quite well.  He increased his dose of gabapentin to 600 mg 3 times daily.  He is tolerating the medication well without any side effects.  He reported that his neck, back and arms are completely unaffected.  For the most part his legs feel good as well.  He has some degree of soreness in his feet however does not have any neuropathic pain.  On a good day he is essentially asymptomatic.  On a bad day he has a little aching in his feet although has had no major numbness, paresthesias and certainly no painful dysesthesias.  Perhaps weather changes makes his condition worse.    The patient went on a trip to [place] and was able to stand and walk for long periods of time.  He works out at a gym on a minimum of 5 days a week.  He feels as though he is stronger with increasing weight and repetitions.  He joked that he feels like a kid.  He received some injection and now oral B12 supplementation.  Detailed neurologic and medical review of systems was otherwise unremarkable.  There were no symptoms to suggest increased intracranial pressure, meningitis or systemic illness.    Review of Systems  Constitutional: negative  Eyes: negative  Ears, nose, mouth, throat, and face: negative  Respiratory: negative  Cardiovascular: negative  Gastrointestinal: negative  Genitourinary:negative  Integument/breast: negative  Hematologic/lymphatic: negative  Musculoskeletal:negative  Neurological: negative  Behavioral/Psych: negative  Endocrine: negative  Allergic/Immunologic: negative    Current Outpatient Medications   Medication Sig Dispense Refill   • ascorbic acid,  vitamin C, (vitamin C) 100 mg tablet Take 100 mg by mouth daily.     • B-complex with vitamin C tablet Take 1 tablet by mouth daily.     • cholecalciferol, vitamin D3, (VITAMIN D3) 1,000 unit capsule Take 1,000 Units by mouth daily.     • chromium picolinate 1,000 mcg tablet Take by mouth.     • fluticasone propionate (FLONASE) 50 mcg/actuation nasal spray Administer 1 spray into each nostril daily. 1 Bottle 3   • garlic 1 mg capsule Take by mouth. 6 capsules daily      • glucosamine sulfate (GLUCOSAMINE) 500 mg tablet Take by mouth.     • lactobacillus 3/FOS/pantethine (PROBIOTIC AND ACIDOPHILUS ORAL) Take by mouth.     • magnesium 30 mg tablet Take 30 mg by mouth 2 (two) times a day.     • multivitamin (THERAGRAN) tablet take 1 tablet by oral route  every day with food     • niacin 500 mg tablet Take 500 mg by mouth daily with breakfast.     • omega 3-dha-epa-fish oil (FISH OIL) 138-183-1,000 mg capsule Take by mouth.     • thiamine (vitamin B-1) 100 mg tablet Take 100 mg by mouth daily.     • vitamin E acetate (VITAMIN E ORAL) Take 400 Units by mouth 4 (four) times a day.       • gabapentin (NEURONTIN) 600 mg tablet Take 1 tablet (600 mg total) by mouth 3 (three) times a day. 90 tablet 3   • sildenafil (VIAGRA) 100 mg tablet Take 1 tablet (100 mg total) by mouth daily as needed for erectile dysfunction. 10 tablet 5     No current facility-administered medications for this visit.        PMH/SH/FH : Unchanged since previous visit.    Objective     Physical Exam  Visit Vitals  /70   Pulse 85   Resp 15       General Appearance:  Alert, no distress, appears stated age               Neurologic Exam:  Alert and oriented. Attention, concentration, memory, language, visual spatial orientation, executive function is normal.    Pupils equal round and reactive to light. Extraocular movement full with normal pursuit + saccades. No nystagmus noted.   Facial strength and sensation is normal. Hearing normal.  The tongue  and uvula were midline. No dysarthria or dysphagia.   Strength was 5/5 in bulbar, axial + extremity muscles.There was normal bulk and tone with no abnormal movements.   The sensory examination was normal to touch, temperature and pain, vibration and proprioception. There was no dysmetria or cerebellar signs.   The gait was narrow based. Patient was able to tandem walk. Negative Romberg sign. Reflexes were ++ and symmetric. Ledesma sign was negative. Plantar responses were flexor.       Problem List Items Addressed This Visit        Nervous    Neuropathy - Primary    Current Assessment & Plan     The patient reported numbness.  A comprehensive work-up was only notable for a slightly low B12 level and he is receiving supplementation.  We will check a B12 level.  He may have a small fiber polyneuropathy.  On gabapentin he has absolutely no neuropathic pain and only reported some soreness in his feet.  He will continue gabapentin 600 mg 3 times daily.  I encouraged him to remain as physically and cognitively active as possible.  Additional diagnostics and treatments will depend on his clinical course and the results of his pending studies.         Numbness    Relevant Orders    Vitamin B12          It was a real pleasure treating Dannie Hardin today, thank you for allowing me to participate in the medical care. If you have any questions, please call me at any time. Dannie Hardin will follow up with me in the coming weeks to months and keep me updated by telephone. Dannie Hardin knows to notify me immediately if there is any change in the condition or if there are any new symptoms of transient or static neurologic dysfunction.    Ceasar French MD

## 2019-12-17 NOTE — ASSESSMENT & PLAN NOTE
The patient reported numbness.  A comprehensive work-up was only notable for a slightly low B12 level and he is receiving supplementation.  We will check a B12 level.  He may have a small fiber polyneuropathy.  On gabapentin he has absolutely no neuropathic pain and only reported some soreness in his feet.  He will continue gabapentin 600 mg 3 times daily.  I encouraged him to remain as physically and cognitively active as possible.  Additional diagnostics and treatments will depend on his clinical course and the results of his pending studies.

## 2019-12-23 RX ORDER — FLUTICASONE PROPIONATE 50 MCG
1 SPRAY, SUSPENSION (ML) NASAL DAILY
Qty: 1 BOTTLE | Refills: 3 | Status: SHIPPED | OUTPATIENT
Start: 2019-12-23 | End: 2020-05-05

## 2019-12-23 NOTE — TELEPHONE ENCOUNTER
Medicine Refill Request    Last Office Visit: 8/14/2019  Next Office Visit: Visit date not found        Current Outpatient Medications:   •  ascorbic acid, vitamin C, (vitamin C) 100 mg tablet, Take 100 mg by mouth daily., Disp: , Rfl:   •  B-complex with vitamin C tablet, Take 1 tablet by mouth daily., Disp: , Rfl:   •  cholecalciferol, vitamin D3, (VITAMIN D3) 1,000 unit capsule, Take 1,000 Units by mouth daily., Disp: , Rfl:   •  chromium picolinate 1,000 mcg tablet, Take by mouth., Disp: , Rfl:   •  fluticasone propionate (FLONASE) 50 mcg/actuation nasal spray, Administer 1 spray into each nostril daily., Disp: 1 Bottle, Rfl: 3  •  gabapentin (NEURONTIN) 600 mg tablet, Take 1 tablet (600 mg total) by mouth 3 (three) times a day., Disp: 90 tablet, Rfl: 3  •  garlic 1 mg capsule, Take by mouth. 6 capsules daily , Disp: , Rfl:   •  glucosamine sulfate (GLUCOSAMINE) 500 mg tablet, Take by mouth., Disp: , Rfl:   •  lactobacillus 3/FOS/pantethine (PROBIOTIC AND ACIDOPHILUS ORAL), Take by mouth., Disp: , Rfl:   •  magnesium 30 mg tablet, Take 30 mg by mouth 2 (two) times a day., Disp: , Rfl:   •  multivitamin (THERAGRAN) tablet, take 1 tablet by oral route  every day with food, Disp: , Rfl:   •  niacin 500 mg tablet, Take 500 mg by mouth daily with breakfast., Disp: , Rfl:   •  omega 3-dha-epa-fish oil (FISH OIL) 138-183-1,000 mg capsule, Take by mouth., Disp: , Rfl:   •  sildenafil (VIAGRA) 100 mg tablet, Take 1 tablet (100 mg total) by mouth daily as needed for erectile dysfunction., Disp: 10 tablet, Rfl: 5  •  thiamine (vitamin B-1) 100 mg tablet, Take 100 mg by mouth daily., Disp: , Rfl:   •  vitamin E acetate (VITAMIN E ORAL), Take 400 Units by mouth 4 (four) times a day.  , Disp: , Rfl:       BP Readings from Last 3 Encounters:   12/17/19 118/70   09/16/19 116/62   08/14/19 126/74       Recent Lab results:  Lab Results   Component Value Date    CHOL 176 08/01/2019   ,   Lab Results   Component Value Date    HDL  41 08/01/2019   ,   Lab Results   Component Value Date    LDLCALC 104 (H) 08/01/2019   ,   Lab Results   Component Value Date    TRIG 154 (H) 08/01/2019        Lab Results   Component Value Date    GLUCOSE 100 (H) 08/01/2019   ,   Lab Results   Component Value Date    HGBA1C 5.4 08/01/2019         Lab Results   Component Value Date    CREATININE 1.02 08/01/2019       Lab Results   Component Value Date    TSH 2.35 04/01/2019

## 2020-04-21 ENCOUNTER — TELEMEDICINE (OUTPATIENT)
Dept: NEUROLOGY | Facility: CLINIC | Age: 70
End: 2020-04-21
Payer: COMMERCIAL

## 2020-04-21 ENCOUNTER — TELEPHONE (OUTPATIENT)
Dept: NEUROLOGY | Facility: CLINIC | Age: 70
End: 2020-04-21

## 2020-04-21 DIAGNOSIS — R53.1 WEAKNESS: ICD-10-CM

## 2020-04-21 DIAGNOSIS — R52 PAIN: Primary | ICD-10-CM

## 2020-04-21 DIAGNOSIS — G62.9 NEUROPATHY: ICD-10-CM

## 2020-04-21 PROCEDURE — 99214 OFFICE O/P EST MOD 30 MIN: CPT | Mod: 95 | Performed by: PSYCHIATRY & NEUROLOGY

## 2020-04-21 RX ORDER — METHYLPREDNISOLONE 4 MG/1
TABLET ORAL
Qty: 21 TABLET | Refills: 0 | Status: SHIPPED | OUTPATIENT
Start: 2020-04-21 | End: 2020-04-28

## 2020-04-21 NOTE — ASSESSMENT & PLAN NOTE
In the past the patient had experience neuropathic pain possibly from a small fiber polyneuropathy.  This has completely resolved on gabapentin which he will continue.    Unfortunately the patient has developed what sounds like radicular pain in the right buttock traveling into the right leg.  Of concern, the patient reported that the leg feels weak.  I am concerned that he has radiculopathy.  The patient will have an MRI of the lumbosacral spine looking for any structural abnormalities.  Down the road we will consider an EMG looking for evidence of a neuromuscular disorder.    Currently the patient is on gabapentin.  He was recently started on Valium.  Ibuprofen has been ineffective.  I recommended a Medrol dose pack.  If ineffective we may consider a pain management evaluation especially if there are structural abnormalities in the spine.  Additional diagnostics and treatments will depend on his clinical course and the results of his pending studies.

## 2020-04-21 NOTE — PROGRESS NOTES
Request for Consent:   Video Encounter   Henrik, my name is Ceasar French MD.  Before we proceed, can you please verify your identification by telling me your full name and date of birth?  Can you tell me who is in the room with you?    You and I are about to have a telemedicine check-in or visit because you have requested it.  This is a live video-conference.  I am a real person, speaking to you in real time.  There is no one else with me on the video-conference.  However, when we use ([employer], [employer], etc) it is important for you to know that the video-conference may not be secure or private.  I am not recording this conversation and I am asking you not to record it.  This telemedicine visit will be billed to your health insurance or you, if you are self-insured.  You understand you will be responsible for any copayments or coinsurances that apply to your telemedicine visit.  Before starting our telemedicine visit, I am required to get your consent for this virtual check-in or visit by telemedicine. Do you consent?    Patient Response to Request for Consent:  Yes      Visit Documentation:  Subjective    This virtual telemedicine visit was performed through [email or web address]    Patient ID: Dannie Hardin is a 70 y.o. male.  1950      HPI   I previously saw the patient months ago.  At that time, neuropathic pain was improving with gabapentin.  I recommended no changes in his medical management.    Since his last visit, the patient reported that his feet have actually been feeling great.  He is taking gabapentin 600 mg 3 times daily.  He tolerates the medication well without any side effects.  He has really had no numbness, paresthesias or dysesthesias in his feet.  His neck and arms remain completely unaffected.    The patient enjoys working out and is obviously frustrated with the coronavirus quarantine.  He reported that he has started to walk long distances approximately 4 to 6 miles a day.  In the past he  remembers having back and leg problems while running and was told that he had piriformis muscle dysfunction.  About 2 weeks ago he began to experience severe pain in the right buttocks.  He denied any other obvious illness, trauma or inciting event.    The patient reported that his right-sided buttock pain can radiate into the hip and into the right anterior thigh and down to below the knee.  He has had no new numbness or paresthesias.  His leg however unfortunately feels heavy weak and clumsy at times.  Any movement whatsoever makes his back and leg pain worse.  His left leg is completely unaffected.  He has had no bowel or bladder dysfunction.  Aleve has provided no improvement in his condition.  He is on day 2 of Valium.  He has had no other diagnostics or treatments.    Aside from his back and leg, the patient otherwise feels great.  He has been eating well.  He has struggled to sleep some because of his pain.  His mood is obviously frustrated.  There were no symptoms to suggest increased intracranial pressure, meningitis or systemic illness.    The following have been reviewed and updated as appropriate in this visit:  Allergies  Meds  Problems       Review of Systems  A comprehensive review of systems was otherwise unremarkable    Assessment/Plan   Diagnoses and all orders for this visit:    Pain (Primary)  Assessment & Plan:  In the past the patient had experience neuropathic pain possibly from a small fiber polyneuropathy.  This has completely resolved on gabapentin which he will continue.    Unfortunately the patient has developed what sounds like radicular pain in the right buttock traveling into the right leg.  Of concern, the patient reported that the leg feels weak.  I am concerned that he has radiculopathy.  The patient will have an MRI of the lumbosacral spine looking for any structural abnormalities.  Down the road we will consider an EMG looking for evidence of a neuromuscular  disorder.    Currently the patient is on gabapentin.  He was recently started on Valium.  Ibuprofen has been ineffective.  I recommended a Medrol dose pack.  If ineffective we may consider a pain management evaluation especially if there are structural abnormalities in the spine.  Additional diagnostics and treatments will depend on his clinical course and the results of his pending studies.    Orders:  -     MRI LUMBAR SPINE WITHOUT CONTRAST; Future  -     EMG; Future  -     Nerve conduction test; Future    Weakness  Assessment & Plan:  It is highly concerning that the patient feels as though his right leg is weak.  For that reason we will check a stat MRI of the lumbosacral spine and consider an EMG down the road.    Orders:  -     MRI LUMBAR SPINE WITHOUT CONTRAST; Future  -     EMG; Future  -     Nerve conduction test; Future    Neuropathy  Assessment & Plan:  The patient reported numbness.  A comprehensive work-up was only notable for a slightly low B12 level and he is receiving supplementation.  He likely has a small fiber polyneuropathy.  He is doing dramatically better on gabapentin; he is tolerating the medication well without any side effects.      Other orders  -     methylPREDNISolone (MEDROLURBANO,) 4 mg tablet; Follow package directions.

## 2020-04-21 NOTE — TELEPHONE ENCOUNTER
I spoke to the patient and his wife regarding his authorization. She stated they may go to another facility and she will call back if they decide to change. I did advice her that she must obtain a disc and bring it to the office for review if it is NOT a [facility] facility    DR BURNETT  MRI LUMBAR SPINE W/O  AUTH: [id number]   EXP: 10/17/2020  JOSIAH MAWR P: [phone]

## 2020-04-21 NOTE — ASSESSMENT & PLAN NOTE
The patient reported numbness.  A comprehensive work-up was only notable for a slightly low B12 level and he is receiving supplementation.  He likely has a small fiber polyneuropathy.  He is doing dramatically better on gabapentin; he is tolerating the medication well without any side effects.

## 2020-04-21 NOTE — ASSESSMENT & PLAN NOTE
It is highly concerning that the patient feels as though his right leg is weak.  For that reason we will check a stat MRI of the lumbosacral spine and consider an EMG down the road.

## 2020-04-22 ENCOUNTER — TELEPHONE (OUTPATIENT)
Dept: NEUROLOGY | Facility: CLINIC | Age: 70
End: 2020-04-22

## 2020-04-22 ENCOUNTER — HOSPITAL ENCOUNTER (OUTPATIENT)
Dept: RADIOLOGY | Age: 70
Discharge: HOME | End: 2020-04-22
Attending: PSYCHIATRY & NEUROLOGY
Payer: COMMERCIAL

## 2020-04-22 DIAGNOSIS — R53.1 WEAKNESS: ICD-10-CM

## 2020-04-22 DIAGNOSIS — R52 PAIN: ICD-10-CM

## 2020-04-22 NOTE — TELEPHONE ENCOUNTER
----- Message from Gretchen Clark sent at 4/22/2020 10:10 AM EDT -----  Regarding: JUST AN FYI FOR  CONCERNING AN MRI  Contact: [phone]  PT'S WIFE(AD) CLD TO LET  KNOW THAT PT HAD THE MRI THIS MORNING @ [facility]. PT HAD A TELEMED VISIT YEST-4/21/20

## 2020-04-23 DIAGNOSIS — R52 PAIN: ICD-10-CM

## 2020-04-23 RX ORDER — GABAPENTIN 600 MG/1
600 TABLET ORAL 3 TIMES DAILY
Qty: 90 TABLET | Refills: 3 | Status: SHIPPED | OUTPATIENT
Start: 2020-04-23 | End: 2020-09-17 | Stop reason: SDUPTHER

## 2020-04-24 ENCOUNTER — TELEPHONE (OUTPATIENT)
Dept: NEUROLOGY | Facility: CLINIC | Age: 70
End: 2020-04-24

## 2020-04-24 NOTE — TELEPHONE ENCOUNTER
Dannie is almost done with the steroid pack but is still experiencing severe pain and very low mobility. His wife called concerned that it is almost the weekend and he is still suffering quite a bit. What can he do going forward?

## 2020-04-27 NOTE — TELEPHONE ENCOUNTER
He is seeing NP via telemed, I will fwd to him to see if and when he will be back in the office to see pt.

## 2020-04-27 NOTE — TELEPHONE ENCOUNTER
----- Message from Vangie Baker sent at 4/27/2020 10:00 AM EDT -----  Regarding: Severe pain  Contact: [phone]  Bhumika Trujillo,   Mr. Pandey's wife ( luzma) just called and stated that her  is still in severe pain even after using the pain patch. She's on the the verge of taking him to ER because the pain he's in is unbearable.

## 2020-04-27 NOTE — TELEPHONE ENCOUNTER
Dr. French wants this patient to have an appointment with Dr. Martina HELTON. Is he seeing new patients? Are you able to facilitate this?

## 2020-04-28 RX ORDER — DIAZEPAM 10 MG/1
TABLET ORAL
COMMUNITY
Start: 2020-04-19 | End: 2020-11-27 | Stop reason: ENTERED-IN-ERROR

## 2020-05-05 RX ORDER — FLUTICASONE PROPIONATE 50 MCG
SPRAY, SUSPENSION (ML) NASAL
Qty: 16 G | Refills: 0 | Status: SHIPPED | OUTPATIENT
Start: 2020-05-05 | End: 2020-09-25

## 2020-05-05 NOTE — TELEPHONE ENCOUNTER
Medicine Refill Request    Last Office Visit: 8/14/2019  Last Telemedicine Visit: Visit date not found    Next Office Visit: Visit date not found  Next Telemedicine Visit: Visit date not found       Current Outpatient Medications: •  ascorbic acid, vitamin C, (vitamin C) 100 mg tablet, Take 100 mg by mouth daily., Disp: , Rfl: •  B-complex with vitamin C tablet, Take 1 tablet by mouth daily., Disp: , Rfl:  •  cholecalciferol, vitamin D3, (VITAMIN D3) 1,000 unit capsule, Take 1,000 Units by mouth daily., Disp: , Rfl: •  chromium picolinate 1,000 mcg tablet, Take by mouth., Disp: , Rfl: •  diazePAM (VALIUM) 10 mg tablet, TAKE 1 TABLET BY MOUTH 2 TO 3 TIMES A DAY AS NEEEDED FOR ANXIETY/BACK AND MUSCLE PAIN/SPASM, Disp: , Rfl: •  fluticasone propionate (FLONASE) 50 mcg/actuation nasal spray, Administer 1 spray into each nostril daily., Disp: 1 Bottle, Rfl: 3•  gabapentin (NEURONTIN) 600 mg tablet, Take 1 tablet (600 mg total) by mouth 3 (three) times a day., Disp: 90 tablet, Rfl: 3•  garlic 1 mg capsule, Take by mouth. 6 capsules daily , Disp: , Rfl: •  glucosamine sulfate (GLUCOSAMINE) 500 mg tablet, Take by mouth., Disp: , Rfl: •  lactobacillus 3/FOS/pantethine (PROBIOTIC AND ACIDOPHILUS ORAL), Take by mouth., Disp: , Rfl: •  magnesium 30 mg tablet, Take 30 mg by mouth 2 (two) times a day., Disp: , Rfl: •  multivitamin (THERAGRAN) tablet, take 1 tablet by oral route  every day with food, Disp: , Rfl: •  niacin 500 mg tablet, Take 500 mg by mouth daily with breakfast., Disp: , Rfl: •  omega 3-dha-epa-fish oil (FISH OIL) 138-183-1,000 mg capsule, Take by mouth., Disp: , Rfl: •  sildenafil (VIAGRA) 100 mg tablet, Take 1 tablet (100 mg total) by mouth daily as needed for erectile dysfunction., Disp: 10 tablet, Rfl: 5•  thiamine (vitamin B-1) 100 mg tablet, Take 100 mg by mouth daily., Disp: , Rfl: •  vitamin E acetate (VITAMIN E ORAL), Take 400 Units by mouth 4 (four) times a day.  , Disp: , Rfl:       BP Readings from  Last 3 Encounters:  12/17/19 : 118/70  09/16/19 : 116/62  08/14/19 : 126/74      Recent Lab results:  Lab Results       Component                Value               Date                       CHOL                     176                 08/01/2019          , Lab Results       Component                Value               Date                       HDL                      41                  08/01/2019          , Lab Results       Component                Value               Date                       LDLCALC                  104 (H)             08/01/2019          , Lab Results       Component                Value               Date                       TRIG                     154 (H)             08/01/2019               Lab Results       Component                Value               Date                       GLUCOSE                  100 (H)             08/01/2019          , Lab Results       Component                Value               Date                       HGBA1C                   5.4                 08/01/2019                Lab Results       Component                Value               Date                       CREATININE               1.02                08/01/2019              Lab Results       Component                Value               Date                       TSH                      2.35                04/01/2019

## 2020-09-17 DIAGNOSIS — R52 PAIN: ICD-10-CM

## 2020-09-17 RX ORDER — GABAPENTIN 600 MG/1
600 TABLET ORAL 3 TIMES DAILY
Qty: 90 TABLET | Refills: 3 | Status: SHIPPED | OUTPATIENT
Start: 2020-09-17 | End: 2021-01-27

## 2020-09-24 RX ORDER — DIFLUPREDNATE 0.5 MG/ML
EMULSION OPHTHALMIC
COMMUNITY
Start: 2020-07-07 | End: 2020-11-27 | Stop reason: ENTERED-IN-ERROR

## 2020-09-24 RX ORDER — DIFLUPREDNATE 0.5 MG/ML
EMULSION OPHTHALMIC
COMMUNITY
Start: 2020-07-30 | End: 2020-11-27 | Stop reason: ENTERED-IN-ERROR

## 2020-09-24 NOTE — TELEPHONE ENCOUNTER
Medicine Refill Request    Last Office Visit: 8/14/2019  Last Telemedicine Visit: Visit date not found    Next Office Visit: 10/5/2020  Next Telemedicine Visit: Visit date not found         Current Outpatient Medications:   •  ascorbic acid, vitamin C, (vitamin C) 100 mg tablet, Take 100 mg by mouth daily., Disp: , Rfl:   •  B-complex with vitamin C tablet, Take 1 tablet by mouth daily., Disp: , Rfl:   •  cholecalciferol, vitamin D3, (VITAMIN D3) 1,000 unit capsule, Take 1,000 Units by mouth daily., Disp: , Rfl:   •  chromium picolinate 1,000 mcg tablet, Take by mouth., Disp: , Rfl:   •  diazePAM (VALIUM) 10 mg tablet, TAKE 1 TABLET BY MOUTH 2 TO 3 TIMES A DAY AS NEEEDED FOR ANXIETY/BACK AND MUSCLE PAIN/SPASM, Disp: , Rfl:   •  DurezoL 0.05 % drops, INSERT 1 DROP IN AFFECTED EYE 4 TIMES A DAY, Disp: , Rfl:   •  DurezoL 0.05 % drops, INSERT 1 DROP IN AFFECTED EYE 4 TIMES A DAY, Disp: , Rfl:   •  fluticasone propionate (FLONASE) 50 mcg/actuation nasal spray, ADMINISTER 1 SPRAY INTO EACH NOSTRIL EVERY DAY, Disp: 16 g, Rfl: 0  •  gabapentin (NEURONTIN) 600 mg tablet, Take 1 tablet (600 mg total) by mouth 3 (three) times a day., Disp: 90 tablet, Rfl: 3  •  garlic 1 mg capsule, Take by mouth. 6 capsules daily , Disp: , Rfl:   •  glucosamine sulfate (GLUCOSAMINE) 500 mg tablet, Take by mouth., Disp: , Rfl:   •  lactobacillus 3/FOS/pantethine (PROBIOTIC AND ACIDOPHILUS ORAL), Take by mouth., Disp: , Rfl:   •  magnesium 30 mg tablet, Take 30 mg by mouth 2 (two) times a day., Disp: , Rfl:   •  multivitamin (THERAGRAN) tablet, take 1 tablet by oral route  every day with food, Disp: , Rfl:   •  niacin 500 mg tablet, Take 500 mg by mouth daily with breakfast., Disp: , Rfl:   •  omega 3-dha-epa-fish oil (FISH OIL) 138-183-1,000 mg capsule, Take by mouth., Disp: , Rfl:   •  sildenafil (VIAGRA) 100 mg tablet, Take 1 tablet (100 mg total) by mouth daily as needed for erectile dysfunction., Disp: 10 tablet, Rfl: 5  •  thiamine  (vitamin B-1) 100 mg tablet, Take 100 mg by mouth daily., Disp: , Rfl:   •  vitamin E acetate (VITAMIN E ORAL), Take 400 Units by mouth 4 (four) times a day.  , Disp: , Rfl:       BP Readings from Last 3 Encounters:   12/17/19 118/70   09/16/19 116/62   08/14/19 126/74       Recent Lab results:  Lab Results   Component Value Date    CHOL 176 08/01/2019   ,   Lab Results   Component Value Date    HDL 41 08/01/2019   ,   Lab Results   Component Value Date    LDLCALC 104 (H) 08/01/2019   ,   Lab Results   Component Value Date    TRIG 154 (H) 08/01/2019        Lab Results   Component Value Date    GLUCOSE 100 (H) 08/01/2019   ,   Lab Results   Component Value Date    HGBA1C 5.4 08/01/2019         Lab Results   Component Value Date    CREATININE 1.02 08/01/2019       Lab Results   Component Value Date    TSH 2.35 04/01/2019

## 2020-09-25 RX ORDER — FLUTICASONE PROPIONATE 50 MCG
SPRAY, SUSPENSION (ML) NASAL
Qty: 1 BOTTLE | Refills: 0 | Status: SHIPPED | OUTPATIENT
Start: 2020-09-25 | End: 2020-10-05

## 2020-10-05 ENCOUNTER — OFFICE VISIT (OUTPATIENT)
Dept: PRIMARY CARE | Facility: CLINIC | Age: 70
End: 2020-10-05
Payer: COMMERCIAL

## 2020-10-05 VITALS
DIASTOLIC BLOOD PRESSURE: 79 MMHG | OXYGEN SATURATION: 95 % | HEART RATE: 77 BPM | RESPIRATION RATE: 16 BRPM | HEIGHT: 72 IN | SYSTOLIC BLOOD PRESSURE: 124 MMHG | TEMPERATURE: 97.9 F | BODY MASS INDEX: 27.77 KG/M2 | WEIGHT: 205 LBS

## 2020-10-05 DIAGNOSIS — M21.611 BUNION OF RIGHT FOOT: ICD-10-CM

## 2020-10-05 DIAGNOSIS — Z00.00 ENCOUNTER FOR GENERAL ADULT MEDICAL EXAMINATION WITHOUT ABNORMAL FINDINGS: Primary | ICD-10-CM

## 2020-10-05 DIAGNOSIS — M47.816 LUMBAR SPONDYLOSIS: ICD-10-CM

## 2020-10-05 DIAGNOSIS — Z12.5 SCREENING FOR PROSTATE CANCER: ICD-10-CM

## 2020-10-05 DIAGNOSIS — E53.8 VITAMIN B12 DEFICIENCY: ICD-10-CM

## 2020-10-05 PROCEDURE — 90471 IMMUNIZATION ADMIN: CPT | Performed by: INTERNAL MEDICINE

## 2020-10-05 PROCEDURE — 99397 PER PM REEVAL EST PAT 65+ YR: CPT | Mod: 25 | Performed by: INTERNAL MEDICINE

## 2020-10-05 PROCEDURE — 90694 VACC AIIV4 NO PRSRV 0.5ML IM: CPT | Performed by: INTERNAL MEDICINE

## 2020-10-05 RX ORDER — FLUTICASONE PROPIONATE 50 MCG
1 SPRAY, SUSPENSION (ML) NASAL DAILY
Qty: 1 BOTTLE | Refills: 5 | Status: SHIPPED | OUTPATIENT
Start: 2020-10-05 | End: 2021-01-26 | Stop reason: SDUPTHER

## 2020-10-05 RX ORDER — SILDENAFIL 100 MG/1
100 TABLET, FILM COATED ORAL DAILY PRN
Qty: 10 TABLET | Refills: 5 | Status: SHIPPED | OUTPATIENT
Start: 2020-10-05 | End: 2022-12-06 | Stop reason: SDUPTHER

## 2020-10-05 ASSESSMENT — ENCOUNTER SYMPTOMS
VOMITING: 0
NERVOUS/ANXIOUS: 0
RHINORRHEA: 0
WHEEZING: 0
WOUND: 0
NECK PAIN: 0
ARTHRALGIAS: 0
HEMATURIA: 0
BACK PAIN: 0
TROUBLE SWALLOWING: 0
LIGHT-HEADEDNESS: 0
FREQUENCY: 0
COUGH: 0
CONSTITUTIONAL NEGATIVE: 1
NAUSEA: 0
DYSURIA: 0
NUMBNESS: 0
SLEEP DISTURBANCE: 0
SHORTNESS OF BREATH: 0
WEAKNESS: 0
CONFUSION: 0
BLOOD IN STOOL: 0
HEMATOLOGIC/LYMPHATIC NEGATIVE: 1
DIARRHEA: 0
SEIZURES: 0
CARDIOVASCULAR NEGATIVE: 1
SORE THROAT: 0
HEADACHES: 0
DIZZINESS: 0
ABDOMINAL DISTENTION: 0
TREMORS: 0
ABDOMINAL PAIN: 0
ENDOCRINE NEGATIVE: 1

## 2020-10-05 NOTE — ASSESSMENT & PLAN NOTE
Screening blood work ordered I await the results.  Of note his last colonoscopy was March 12, 2015 when he had an obstruction.  Otherwise the study was normal.

## 2020-10-05 NOTE — ASSESSMENT & PLAN NOTE
He underwent surgery earlier in the year and a quality of his life and pain control is dramatically improved.  He now is able to buy cycle without any pain

## 2020-10-05 NOTE — PROGRESS NOTES
Subjective      Patient ID: Dannie Hardin is a 70 y.o. male.    HPI    The following have been reviewed and updated as appropriate in this visit:  Allergies  Meds  Problems       Review of Systems   Constitutional: Negative.    HENT: Positive for congestion. Negative for hearing loss, nosebleeds, rhinorrhea, sore throat, tinnitus and trouble swallowing.    Eyes: Negative for visual disturbance.   Respiratory: Negative for cough, shortness of breath and wheezing.    Cardiovascular: Negative.    Gastrointestinal: Negative for abdominal distention, abdominal pain, blood in stool, diarrhea, nausea and vomiting.   Endocrine: Negative.    Genitourinary: Negative for dysuria, frequency, hematuria and urgency.   Musculoskeletal: Negative for arthralgias, back pain and neck pain.   Skin: Negative for rash and wound.   Allergic/Immunologic: Negative for environmental allergies.   Neurological: Negative for dizziness, tremors, seizures, syncope, weakness, light-headedness, numbness and headaches.   Hematological: Negative.    Psychiatric/Behavioral: Negative for confusion and sleep disturbance. The patient is not nervous/anxious.    viable    Objective     Physical Exam  Vitals signs reviewed.   Constitutional:       Appearance: Normal appearance.   HENT:      Head: Normocephalic and atraumatic.   Eyes:      Conjunctiva/sclera: Conjunctivae normal.      Pupils: Pupils are equal, round, and reactive to light.   Neck:      Musculoskeletal: Normal range of motion.   Cardiovascular:      Rate and Rhythm: Normal rate and regular rhythm.   Pulmonary:      Effort: Pulmonary effort is normal. No respiratory distress.      Breath sounds: No wheezing or rales.   Abdominal:      General: Abdomen is flat. There is no distension.      Tenderness: There is no abdominal tenderness.   Musculoskeletal: Normal range of motion.      Right lower leg: No edema.      Left lower leg: No edema.   Skin:     General: Skin is warm.   Neurological:       General: No focal deficit present.      Mental Status: He is alert.      Cranial Nerves: No cranial nerve deficit.   Psychiatric:         Mood and Affect: Mood normal.         Behavior: Behavior normal.         Assessment/Plan   Problem List Items Addressed This Visit        Digestive    Vitamin B12 deficiency    Relevant Orders    Vitamin B12       Musculoskeletal    Lumbar spondylosis     He underwent surgery earlier in the year and a quality of his life and pain control is dramatically improved.  He now is able to buy cycle without any pain         Bunion of right foot     He is having surgery later this month to have this addressed            Other    Encounter for general adult medical examination without abnormal findings - Primary     Screening blood work ordered I await the results.  Of note his last colonoscopy was March 12, 2015 when he had an obstruction.  Otherwise the study was normal.         Relevant Orders    CBC and Differential    Comprehensive metabolic panel    Hemoglobin A1c    Lipid panel    ECG 12 lead    Screening for prostate cancer    Relevant Orders    PSA

## 2020-11-24 ENCOUNTER — DOCUMENTATION (OUTPATIENT)
Dept: COLORECTAL SURGERY | Facility: CLINIC | Age: 70
End: 2020-11-24

## 2020-11-24 NOTE — PROGRESS NOTES
1      2             3              4             5     LAST APPOINTMENT:  02/15/2018     TIME ARRIVED:     TIME ROOMED:    VISIT TYPE:   NP       POV     EPV     DISC:             YES         NO                                   PREPPED:  YES  NO     LABS:  [facility]  [facility]  [facility]  OTHER      PHARMACY ENTERED:  YES   NO     DIAGNOSIS:  Bleeding hemorrhoids   SURGERY                                                                                           DATE OF SURGERY     PATHOLOGY STAGE:   RADIATION :    Yes   No                                           DOSAGE:     LOCATION:   Anterior   Right Lateral   Left Lateral   Posterior   Circumferential     LEVEL:                        SIZE:    CT SCAN:   FFC:    FFS:    CEA:    PET:    MRI:      OTHER:       FIBER:  NO  METAMUCIL  KONSYL  CITYRUCEL   FIBERCON  BENEFIBER OTHER  LOMOTIL:    NO    1  VS   2    QD    BID    TID    QID  ZANTAC:       Y / N  AMPHOGEL:    Y / N                                                                          START / STOP                                                 RAD ONC:                              N / A  MED ONC:                              N / A

## 2020-11-25 ENCOUNTER — PREP FOR CASE (OUTPATIENT)
Dept: COLORECTAL SURGERY | Facility: CLINIC | Age: 70
End: 2020-11-25

## 2020-11-25 ENCOUNTER — OFFICE VISIT (OUTPATIENT)
Dept: COLORECTAL SURGERY | Facility: CLINIC | Age: 70
End: 2020-11-25
Payer: COMMERCIAL

## 2020-11-25 VITALS
DIASTOLIC BLOOD PRESSURE: 90 MMHG | SYSTOLIC BLOOD PRESSURE: 130 MMHG | TEMPERATURE: 96.6 F | HEIGHT: 72 IN | WEIGHT: 200 LBS | BODY MASS INDEX: 27.09 KG/M2

## 2020-11-25 DIAGNOSIS — Z01.818 PREOP TESTING: ICD-10-CM

## 2020-11-25 DIAGNOSIS — K62.9 PERIANAL LESION: Primary | ICD-10-CM

## 2020-11-25 DIAGNOSIS — Z11.59 SCREENING FOR VIRAL DISEASE: Primary | ICD-10-CM

## 2020-11-25 DIAGNOSIS — K64.0 FIRST DEGREE HEMORRHOIDS: ICD-10-CM

## 2020-11-25 PROCEDURE — 46600 DIAGNOSTIC ANOSCOPY SPX: CPT | Performed by: COLON & RECTAL SURGERY

## 2020-11-25 PROCEDURE — 99204 OFFICE O/P NEW MOD 45 MIN: CPT | Mod: 25 | Performed by: COLON & RECTAL SURGERY

## 2020-11-25 NOTE — ASSESSMENT & PLAN NOTE
Patient has a lesion in the right lateral position 1 cm from the anal verge that is about 4 cm in size.  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  That said, this could represent something more sinister like a cancer.  This needs to be excised.  I plan have the patient on the OR schedule in December 2 to accomplish this.  We will plan for wide local excision and primary closure of the wound.  I discussed with the patient the possibility of wound dehiscence due to the nature of the wound and the location and that would be the case with certainly delayed wound healing but not necessarily his postoperative pain.  We will plan to use Exparel.

## 2020-11-25 NOTE — H&P (VIEW-ONLY)
"HISTORY & PHYSICAL EXAM  CC: \"Bleeding hemorrhoid\"    HPI: Dannie is here today complaining of a \"bleeding hemorrhoids\".  He has been seen since 2016.  He has a complex surgical history.  History of chronic small bowel obstructions following a laparoscopic cholecystectomy.  Per the patient he had 2 exploratory surgeries at an outside hospital.  He says the first 1 was laparoscopic with a lysed adhesions.  He then presented again with a bowel obstruction and then had an open surgery where part of his small bowel was removed as well his appendix.  He then presented to our office where he underwent exploratory laparoscopy with open laparotomy, extensive adhesiolysis, small bowel resection and end ileostomy in March 2015.  He was subsequently reversed in July 2015 which required right colon mobilization and more extensive adhesiolysis.  Functional standpoint he says he is doing better.  He is moving his bowels 3-5 times a day, with good control.  He says at times he has looser stool.  He is not taking a fiber supplement daily.  Over the last few months he has been noticing bright red blood after moving his bowels on the toilet paper.  He believes this is from an external hemorrhoid says he has had them for \"50 years\".  He is not having any pain whatsoever.  Denies fever, chills, nausea, vomiting, bloating, abdominal pain, unintentional weight loss, soilage, pain, trauma, fecal accidents, urinary incontinence.    The patient has not been here for a number of years.  He notes has had this \"hemorrhoid\" in his perianal region for 50 years now.  Is been bleeding pretty much 3 times a week on the paper when he wipes he sees blood.  Is not really dropping in the bowl.  Does not feel any extra tissue prolapsing he is to push back in.  He does not take a fiber supplementation daily but he does eat a lot of fiber in his diet.  He does not strain to move his bowels.  His GI doctor is Dr. Alondra Yoon.  She has referred her back " to us for this perianal lesion.  His next colonoscopy with her will not be until 2025.    Past Medical History:   Diagnosis Date   • Bowel obstruction (CMS/HCC)      Past Surgical History:   Procedure Laterality Date   • COLON SURGERY  03/12/2015   • COLON SURGERY  07/13/2015   • FOOT SURGERY Right    • RESECTION SMALL BOWEL / CLOSURE ILEOSTOMY         Current Outpatient Medications:   •  ascorbic acid, vitamin C, (vitamin C) 100 mg tablet, Take 100 mg by mouth daily., Disp: , Rfl:   •  B-complex with vitamin C tablet, Take 1 tablet by mouth daily., Disp: , Rfl:   •  cholecalciferol, vitamin D3, (VITAMIN D3) 1,000 unit capsule, Take 1,000 Units by mouth daily., Disp: , Rfl:   •  chromium picolinate 1,000 mcg tablet, Take by mouth., Disp: , Rfl:   •  fluticasone propionate (FLONASE) 50 mcg/actuation nasal spray, Administer 1 spray into each nostril daily., Disp: 1 Bottle, Rfl: 5  •  gabapentin (NEURONTIN) 600 mg tablet, Take 1 tablet (600 mg total) by mouth 3 (three) times a day., Disp: 90 tablet, Rfl: 3  •  garlic 1 mg capsule, Take by mouth. 6 capsules daily , Disp: , Rfl:   •  glucosamine sulfate (GLUCOSAMINE) 500 mg tablet, Take by mouth., Disp: , Rfl:   •  lactobacillus 3/FOS/pantethine (PROBIOTIC AND ACIDOPHILUS ORAL), Take by mouth., Disp: , Rfl:   •  magnesium 30 mg tablet, Take 30 mg by mouth 2 (two) times a day., Disp: , Rfl:   •  multivitamin (THERAGRAN) tablet, take 1 tablet by oral route  every day with food, Disp: , Rfl:   •  niacin 500 mg tablet, Take 500 mg by mouth daily with breakfast., Disp: , Rfl:   •  omega 3-dha-epa-fish oil (FISH OIL) 138-183-1,000 mg capsule, Take by mouth., Disp: , Rfl:   •  thiamine (vitamin B-1) 100 mg tablet, Take 100 mg by mouth daily., Disp: , Rfl:   •  snok-nuyz-zgl-vyx-nuf-khbe-hor 546-363-013-125 mg tablet, Take by mouth., Disp: , Rfl:   •  vitamin E acetate (VITAMIN E ORAL), Take 400 Units by mouth 4 (four) times a day.  , Disp: , Rfl:   •  diazePAM (VALIUM) 10 mg  tablet, TAKE 1 TABLET BY MOUTH 2 TO 3 TIMES A DAY AS NEEEDED FOR ANXIETY/BACK AND MUSCLE PAIN/SPASM, Disp: , Rfl:   •  DurezoL 0.05 % drops, INSERT 1 DROP IN AFFECTED EYE 4 TIMES A DAY, Disp: , Rfl:   •  DurezoL 0.05 % drops, INSERT 1 DROP IN AFFECTED EYE 4 TIMES A DAY, Disp: , Rfl:   •  sildenafiL (VIAGRA) 100 mg tablet, Take 1 tablet (100 mg total) by mouth daily as needed for erectile dysfunction., Disp: 10 tablet, Rfl: 5   Allergies   Allergen Reactions   • No Known Allergies      Social History     Socioeconomic History   • Marital status:      Spouse name: Not on file   • Number of children: Not on file   • Years of education: Not on file   • Highest education level: Not on file   Occupational History   • Not on file   Social Needs   • Financial resource strain: Not on file   • Food insecurity     Worry: Not on file     Inability: Not on file   • Transportation needs     Medical: Not on file     Non-medical: Not on file   Tobacco Use   • Smoking status: Former Smoker   • Smokeless tobacco: Never Used   Substance and Sexual Activity   • Alcohol use: No   • Drug use: No   • Sexual activity: Not on file   Lifestyle   • Physical activity     Days per week: Not on file     Minutes per session: Not on file   • Stress: Not on file   Relationships   • Social connections     Talks on phone: Not on file     Gets together: Not on file     Attends Protestant service: Not on file     Active member of club or organization: Not on file     Attends meetings of clubs or organizations: Not on file     Relationship status: Not on file   • Intimate partner violence     Fear of current or ex partner: Not on file     Emotionally abused: Not on file     Physically abused: Not on file     Forced sexual activity: Not on file   Other Topics Concern   • Not on file   Social History Narrative   • Not on file      Family History   Problem Relation Age of Onset   • Hyperlipidemia Biological Mother    • Angina Biological Mother    •  Hypertension Biological Mother    • Stroke Biological Father    • Diabetes Biological Father        REVIEW OF SYSTEMS: A complete review of systems was performed, pertinent positive include in the HPI remainder negative.    PHYSICAL EXAM:  GEN: On examination the patient is resting comfortably.  NEURO/PSYCH: Alert and oriented ×3  HEENT: Eyes: Sclera anicteric  CHEST: Equal rise and fall the chest.  No tenderness bilaterally.  SKIN: Warm and moist  NODES: No groin or cervical lymphadenopathy  EXT: No palpable edema of the bilateral lower extremities.  No clubbing.  GI: Abdomen soft, nontender, nondistended.  No palpable liver or spleen edge.  No ventral hernias, mass, or tenderness.  RECTAL: Perianal skin demonstrates a lesion in the right lateral position 1 cm from the anal verge that appears to be a large condyloma and a cauliflower configuration.  It is firm at the base.  Digital exam reveals a normal sphincter tone with no masses palpable.  Diagnostic anoscopy demonstrates enlarged grade 1 internal hemorrhoids without stigmata of bleeding or prolapse in the right anterior, right posterior, and left lateral positions.      ASSESSMENT/PLAN:     Perianal lesion  Patient has a lesion in the right lateral position 1 cm from the anal verge that is about 4 cm in size.  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  That said, this could represent something more sinister like a cancer.  This needs to be excised.  I plan have the patient on the OR schedule in December 2 to accomplish this.  We will plan for wide local excision and primary closure of the wound.  I discussed with the patient the possibility of wound dehiscence due to the nature of the wound and the location and that would be the case with certainly delayed wound healing but not necessarily his postoperative pain.  We will plan to use Exparel.    First degree hemorrhoids  The patient has significantly enlarged internal hemorrhoids but do not have  stigmata of bleeding or prolapse.  At this point I suggested fiber therapy be added to his dietary regimen.  We will need to reassess matters once the perianal lesion is excised and healed to make sure he is not continue to bleed.  If he is, options at that point would be rubber band ligation versus THD.  We will need to discuss this further if his symptoms not improve after excision of this lesion that he has in the perianal region.

## 2020-11-25 NOTE — ASSESSMENT & PLAN NOTE
The patient has significantly enlarged internal hemorrhoids but do not have stigmata of bleeding or prolapse.  At this point I suggested fiber therapy be added to his dietary regimen.  We will need to reassess matters once the perianal lesion is excised and healed to make sure he is not continue to bleed.  If he is, options at that point would be rubber band ligation versus THD.  We will need to discuss this further if his symptoms not improve after excision of this lesion that he has in the perianal region.

## 2020-11-25 NOTE — PROGRESS NOTES
"HISTORY & PHYSICAL EXAM  CC: \"Bleeding hemorrhoid\"    HPI: Dannie is here today complaining of a \"bleeding hemorrhoids\".  He has been seen since 2016.  He has a complex surgical history.  History of chronic small bowel obstructions following a laparoscopic cholecystectomy.  Per the patient he had 2 exploratory surgeries at an outside hospital.  He says the first 1 was laparoscopic with a lysed adhesions.  He then presented again with a bowel obstruction and then had an open surgery where part of his small bowel was removed as well his appendix.  He then presented to our office where he underwent exploratory laparoscopy with open laparotomy, extensive adhesiolysis, small bowel resection and end ileostomy in March 2015.  He was subsequently reversed in July 2015 which required right colon mobilization and more extensive adhesiolysis.  Functional standpoint he says he is doing better.  He is moving his bowels 3-5 times a day, with good control.  He says at times he has looser stool.  He is not taking a fiber supplement daily.  Over the last few months he has been noticing bright red blood after moving his bowels on the toilet paper.  He believes this is from an external hemorrhoid says he has had them for \"50 years\".  He is not having any pain whatsoever.  Denies fever, chills, nausea, vomiting, bloating, abdominal pain, unintentional weight loss, soilage, pain, trauma, fecal accidents, urinary incontinence.    The patient has not been here for a number of years.  He notes has had this \"hemorrhoid\" in his perianal region for 50 years now.  Is been bleeding pretty much 3 times a week on the paper when he wipes he sees blood.  Is not really dropping in the bowl.  Does not feel any extra tissue prolapsing he is to push back in.  He does not take a fiber supplementation daily but he does eat a lot of fiber in his diet.  He does not strain to move his bowels.  His GI doctor is Dr. Alondra Yoon.  She has referred her back " to us for this perianal lesion.  His next colonoscopy with her will not be until 2025.    Past Medical History:   Diagnosis Date   • Bowel obstruction (CMS/HCC)      Past Surgical History:   Procedure Laterality Date   • COLON SURGERY  03/12/2015   • COLON SURGERY  07/13/2015   • FOOT SURGERY Right    • RESECTION SMALL BOWEL / CLOSURE ILEOSTOMY         Current Outpatient Medications:   •  ascorbic acid, vitamin C, (vitamin C) 100 mg tablet, Take 100 mg by mouth daily., Disp: , Rfl:   •  B-complex with vitamin C tablet, Take 1 tablet by mouth daily., Disp: , Rfl:   •  cholecalciferol, vitamin D3, (VITAMIN D3) 1,000 unit capsule, Take 1,000 Units by mouth daily., Disp: , Rfl:   •  chromium picolinate 1,000 mcg tablet, Take by mouth., Disp: , Rfl:   •  fluticasone propionate (FLONASE) 50 mcg/actuation nasal spray, Administer 1 spray into each nostril daily., Disp: 1 Bottle, Rfl: 5  •  gabapentin (NEURONTIN) 600 mg tablet, Take 1 tablet (600 mg total) by mouth 3 (three) times a day., Disp: 90 tablet, Rfl: 3  •  garlic 1 mg capsule, Take by mouth. 6 capsules daily , Disp: , Rfl:   •  glucosamine sulfate (GLUCOSAMINE) 500 mg tablet, Take by mouth., Disp: , Rfl:   •  lactobacillus 3/FOS/pantethine (PROBIOTIC AND ACIDOPHILUS ORAL), Take by mouth., Disp: , Rfl:   •  magnesium 30 mg tablet, Take 30 mg by mouth 2 (two) times a day., Disp: , Rfl:   •  multivitamin (THERAGRAN) tablet, take 1 tablet by oral route  every day with food, Disp: , Rfl:   •  niacin 500 mg tablet, Take 500 mg by mouth daily with breakfast., Disp: , Rfl:   •  omega 3-dha-epa-fish oil (FISH OIL) 138-183-1,000 mg capsule, Take by mouth., Disp: , Rfl:   •  thiamine (vitamin B-1) 100 mg tablet, Take 100 mg by mouth daily., Disp: , Rfl:   •  btua-ilzv-bja-cvk-lux-lpzi-hor 650-208-428-125 mg tablet, Take by mouth., Disp: , Rfl:   •  vitamin E acetate (VITAMIN E ORAL), Take 400 Units by mouth 4 (four) times a day.  , Disp: , Rfl:   •  diazePAM (VALIUM) 10 mg  tablet, TAKE 1 TABLET BY MOUTH 2 TO 3 TIMES A DAY AS NEEEDED FOR ANXIETY/BACK AND MUSCLE PAIN/SPASM, Disp: , Rfl:   •  DurezoL 0.05 % drops, INSERT 1 DROP IN AFFECTED EYE 4 TIMES A DAY, Disp: , Rfl:   •  DurezoL 0.05 % drops, INSERT 1 DROP IN AFFECTED EYE 4 TIMES A DAY, Disp: , Rfl:   •  sildenafiL (VIAGRA) 100 mg tablet, Take 1 tablet (100 mg total) by mouth daily as needed for erectile dysfunction., Disp: 10 tablet, Rfl: 5   Allergies   Allergen Reactions   • No Known Allergies      Social History     Socioeconomic History   • Marital status:      Spouse name: Not on file   • Number of children: Not on file   • Years of education: Not on file   • Highest education level: Not on file   Occupational History   • Not on file   Social Needs   • Financial resource strain: Not on file   • Food insecurity     Worry: Not on file     Inability: Not on file   • Transportation needs     Medical: Not on file     Non-medical: Not on file   Tobacco Use   • Smoking status: Former Smoker   • Smokeless tobacco: Never Used   Substance and Sexual Activity   • Alcohol use: No   • Drug use: No   • Sexual activity: Not on file   Lifestyle   • Physical activity     Days per week: Not on file     Minutes per session: Not on file   • Stress: Not on file   Relationships   • Social connections     Talks on phone: Not on file     Gets together: Not on file     Attends Church service: Not on file     Active member of club or organization: Not on file     Attends meetings of clubs or organizations: Not on file     Relationship status: Not on file   • Intimate partner violence     Fear of current or ex partner: Not on file     Emotionally abused: Not on file     Physically abused: Not on file     Forced sexual activity: Not on file   Other Topics Concern   • Not on file   Social History Narrative   • Not on file      Family History   Problem Relation Age of Onset   • Hyperlipidemia Biological Mother    • Angina Biological Mother    •  Hypertension Biological Mother    • Stroke Biological Father    • Diabetes Biological Father        REVIEW OF SYSTEMS: A complete review of systems was performed, pertinent positive include in the HPI remainder negative.    PHYSICAL EXAM:  GEN: On examination the patient is resting comfortably.  NEURO/PSYCH: Alert and oriented ×3  HEENT: Eyes: Sclera anicteric  CHEST: Equal rise and fall the chest.  No tenderness bilaterally.  SKIN: Warm and moist  NODES: No groin or cervical lymphadenopathy  EXT: No palpable edema of the bilateral lower extremities.  No clubbing.  GI: Abdomen soft, nontender, nondistended.  No palpable liver or spleen edge.  No ventral hernias, mass, or tenderness.  RECTAL: Perianal skin demonstrates a lesion in the right lateral position 1 cm from the anal verge that appears to be a large condyloma and a cauliflower configuration.  It is firm at the base.  Digital exam reveals a normal sphincter tone with no masses palpable.  Diagnostic anoscopy demonstrates enlarged grade 1 internal hemorrhoids without stigmata of bleeding or prolapse in the right anterior, right posterior, and left lateral positions.      ASSESSMENT/PLAN:     Perianal lesion  Patient has a lesion in the right lateral position 1 cm from the anal verge that is about 4 cm in size.  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  That said, this could represent something more sinister like a cancer.  This needs to be excised.  I plan have the patient on the OR schedule in December 2 to accomplish this.  We will plan for wide local excision and primary closure of the wound.  I discussed with the patient the possibility of wound dehiscence due to the nature of the wound and the location and that would be the case with certainly delayed wound healing but not necessarily his postoperative pain.  We will plan to use Exparel.    First degree hemorrhoids  The patient has significantly enlarged internal hemorrhoids but do not have  stigmata of bleeding or prolapse.  At this point I suggested fiber therapy be added to his dietary regimen.  We will need to reassess matters once the perianal lesion is excised and healed to make sure he is not continue to bleed.  If he is, options at that point would be rubber band ligation versus THD.  We will need to discuss this further if his symptoms not improve after excision of this lesion that he has in the perianal region.

## 2020-11-25 NOTE — PATIENT INSTRUCTIONS
"[facility] Fiber Diet    Fiber goal:  30 - 35 grams of fiber per day  This is equivalent to 10 apples or 5 cups of white rice or 2 1/2 cups of solano beans. Most of us don't eat this way, so we need to take a supplement.    Types of fiber  Insoluble fiber does not dissolve in water.    • It is found in wheat, rye, bran, and other grains, and it is the fiber found in most vegetables.  • Psyllium is a common source of insoluble fiber in fiber supplements.  • As a supplement, this makes a thick solution which should be drunk at once, not sipped on from the cup.  • Insoluble fiber is the best stool bulking agent and is less likely to cause gas because the intestinal bacteria do not digest it so they do not produce more gas.  • Examples of insoluble fiber products (each has 5 grams of fiber per teaspoon)  o Konsyl: Fiber source is psyllium. It is unflavored, so it needs to be mixed with juice or another flavored non-carbonated beverage rather than water.  o Citrucel: Fiber source is hemicelluloses. It is flavored  o Metamucil: Fiber source is psyllium (and some insoluble fiber). It is flavored. Get the \"original texture\".  o FiberCon: Fiber source is polycarbophil    Soluble fiber dissolves in water and forms a gelatinous substance in the bowel.  • It is found in oatmeal, oat bran, fruit, barley, and legumes (beans).  • It is digested by intestinal bacteria and therefore is more likely to increase intestinal gas than insoluble fiber.  • Soluble fiber appears to bind cholesterol which is then passed in the stool.  It enough insoluble fiber is eaten, the blood cholesterol can be lowered by 10-15%.  • Examples of insoluble fiber products  o Benefiber: Flavorless    Fiber Facts  Bulking agents (not laxatives).  Although fiber supplements are sold in the laxative section of the pharmacy, they are stool bulking agents, not laxatives.  Because they help retain water in the stool, they work like a sponge in the bowel.  For the person " with dry, hard, small bowel movements, the extra water will keep the stool softer and larger and make it easier to pass.  For the person with loose stool or seepage of fecal material between bowel movements, the extra fluid will be sopped up, resulting in a firmer bowel movement and less seepage.  Better stool consistency.  The consistency of the bulky, fiber-rich stool is not pasty like some laxatives cause and is often easier to clean up since it sticks to itself rather than you.  Benefits beyond a good BM.  There is good data in the medical literature that high fiber diets can:  • Reduce hemorrhoid problems  • Protect against development of diverticulitis and prevent progression of diverticulosis    Introducing Fiber  Increasing fiber suddenly can cause a feeling of bloating or excess gas.  • Start with a teaspoon a day for the first week, two teaspoons per day (same glass) in second week, and heaping tablespoon in third week.  • Drink at least 64 ounces of water daily. Eight, 8 ounces glasses is ideal.      Dietary Sources of Fiber  Don’t want to take a fiber supplement?  • Good sources of fiber in the diet include  o Whole grain foods (such as bran cereals) and breads (those made with whole -wheat grains).  o Fresh fruits (including the skin and the pulp).  o Dried or stewed fruits (such as prunes, raisins, or apricots).  o Root vegetables (such as carrots, turnips or potatoes)  o Raw or fresh vegetables (such as cabbage - lettuce is actually low in fiber)  • Foods that are rich in fiber are often low in fat - so there are two benefits to eating them.  • Most packaged foods list the fiber content in grams per serving (and the serving size will also be listed).  • Meats and dairy products are not good sources of fiber.       Serving Size Grams  Fiber  Serving  Size Grams  Fiber   Cereals   Vegetables     Fiber One  Bran Cereal 1/2 cup 14 Broccoli 1/2 cup 2.2   [employer] Organic High Fiber Hot Cereal 1/3 cup  "10 Brussel  Sprouts 1/2 cup 2.3   Kashi  Original Go  Lean 1 cup 10 Carrots 1/2 cup 2.3   All-Bran 1/3 cup 8.5 Corn,  canned 1/2 cup 2.3   Bran Buds 1/3 cup 7.9 Parsnip 1/2 cup 2.7   100% Bran 3/4 cup 8.4 Peas 1/2 cup 3.6   Raisin Bran 3/4 cup 4.0 Potato, with  skin 1 2.5   Shredded 2/3 cup 2.6 Spinach 1/2 cup 2.1           Wheat        Total 1 cup 2.0      Wheat Chex 2/3 cup 2.1      Wheat germ 1/4 cup 3.4 Fruits        Apple, with  skin 1 3.5   Seeds   Apple,  without skin 1 2.7   David Seeds 1 oz 10 g Banana 1 2.4   Flax seeds, whole 1  Tablespoon 10.3 Blueberries 1/2 cup 2.0      Orange 1 2.6   Nuts   Peach, with  skin 1 1.9   Almonds 10 nuts 1.1 Pear, with  skin 1/2 large 3.1   Peanuts 10 nuts 1.4 Prunes 3 3.0      Raisins 1/4 cup 3.1   Breads   Raspberries 1/2 cup 3.1   Bran Muffin 1 2.5 Strawberries 1 cup 3.0   Crisp Rye  Bread 2 2.0      Whole  Wheat  Bread 1 slice 1.4 Legumes  (Beans)        Baked  Beans 1/2 cup 8.9   Pasta &  Rice   Dried Beans 1/2 cup 4.7   Brown  Rice 1/2 cup 1.0 Kidney  Beans 1/2 cup 7.3   Macaroni or  Spaghetti 1 cup 1.0 Lentils 1/2 cup 3.7   Spaghetti, Whole Wheat 1 cup 3.9 Alas Beans 1/2 cup 4.5      Navy Beans 1/2 cup 6.0     Other Resources/Fiber Calculators  (.   USDA National Nutrient Data Base  (http://www.nal.usda.gov/fnic/foodcomp/search/)  (.   [employer] Nutrition Facts (http://www.[employer]/f ood-brands/popular)  Hemorrhoids    Did you know...    · Hemorrhoids are one of the most common ailments known.  · More than half the population will develop hemorrhoids, usually after age 30.  · Millions of Americans currently suffer from hemorrhoids.  · The average person suffers in silence for a long period before seeking medical care.  · Today's treatment methods make some types of hemorrhoid removal much less painful. What are hemorrhoids?    Often described as \"varicose veins of the anus and rectum\", hemorrhoids are enlarged, bulging blood vessels in and " "about the anus and lower rectum. There are two types of  hemorrhoids: external and internal, which refer to their location.    External (outside) hemorrhoids develop near the anus and are covered by very sensitive skin. These are usually painless. However, if a blood clot (thrombosis) develops in an external hemorrhoid, it becomes a painful, hard lump. The external hemorrhoid may bleed if it ruptures.    Internal (inside) hemorrhoids develop within the anus beneath the lining. Painless bleeding and protrusion during bowel movements are the most common symptom. However, an internal hemorrhoid can cause severe pain if it is completely \"prolapsed\" - protrudes from the anal opening and cannot be pushed back inside.    What causes hemorrhoids?    An exact cause is unknown; however, the upright posture of humans alone forces a great deal of pressure on the rectal veins, which sometimes causes them to bulge. Other contributing factors include:    · Aging  · Chronic constipation or diarrhea  · Pregnancy  · Heredity  · Straining during bowel movements  · Faulty bowel function due to overuse of laxatives or enemas  · Spending long periods of time (e.g., reading) on the toilet    Whatever the cause, the tissues supporting the vessels stretch. As a result, the vessels dilate; their walls become thin and bleed. If the stretching and pressure continue, the weakened vessels protrude.            What are the symptoms?  If you notice any of the following, you could have hemorrhoids:  · Bleeding during bowel movements  · Protrusion during bowel movements  · Itching in the anal area  · Pain  · Sensitive lump(s)    How are hemorrhoids treated?    Mild symptoms can be relieved frequently by increasing the amount of fiber (e.g., fruits, vegetables, breads and cereals) and fluids in the diet. Eliminating excessive straining reduces the pressure on hemorrhoids and helps prevent them from protruding. A sitz bath - sitting in plain warm " water for about 10 minutes - can also provide some relief .  With these measures, the pain and swelling of most symptomatic hemorrhoids will decrease in two to seven days, and the firm lump should recede within four to six weeks. In cases of severe or persistent pain from a thrombosed hemorrhoid, your physician may elect to remove the hemorrhoid containing the clot with a small incision. Performed under local anesthesia as an outpatient, this procedure generally provides relief.    Severe hemorrhoids may require special treatment, much of which can be performed on an outpatient basis.    · Ligation -- the rubber band treatment - works effectively on internal hemorrhoids that protrude with bowel movements. A small rubber band is placed over the hemorrhoid, cutting off its blood supply. The hemorrhoid and the band fall off in a few days and the wound usually heals in a week or two. This procedure sometimes produces mild discomfort and bleeding and may need to be repeated for a full effect.    · Injection and Coagulation can also be used on bleeding hemorrhoids that do not protrude. Both methods are relatively painless and cause the hemorrhoid to shrivel up.    · Hemorrhoid stapling -- this is a technique that uses a special device to internally staple and excise    internal hemorrhoidal tissue. The stapling method may lead to shrinkage of but does not remove external hemorrhoids. This procedure is generally more painful that rubber band ligation and less painful than hemorroidectomy.    - Hemorrhoidectomy -- surgery to remove the hemorrhoids - is the most complete method for removal of internal and external hemorrhoids. It is necessary when (1) clots repeatedly form in external hemorrhoids; (2) ligation fails to treat internal hemorrhoids; (3) the protruding hemorrhoid cannot be reduced; or (4) there is persistent bleeding. A hemorrhoidectomy removes excessive tissue that causes the bleeding and protrusion. It is done  under anesthesia using either sutures or staplers, and may, depending upon circumstances, require hospitalization and a period of inactivity. Laser hemorrhoidectomies do not offer any advantage over standard operative techniques. They are also quite expensive, and contrary to popular belief, are no less painful.  Rubber Band Ligation of Internal Hemorrhoids:Bulging, bleeding, internal hemorrhoid Rubber band applied at the base of the hemorrhoid  About 7 days later, the banded hemorrhoid has fallen off leaving a small scar at its base (arrow)    THD (trans-anal Hemorrhoidal Dearterialization) - This is a procedure that is done   To treat hemorrhoids operatively but without excision. This allows for a speedier recovery with less pain and no wounds to heal. It is an excellent option for many patients' hemorrhoids but not for all. Typically the decision is made at the time of surgery as to which method to use when treating your hemorrhoids in a personalized way aimed at being as minimally invasive as possible.     Do hemorrhoids lead to cancer?    No. There is no relationship between hemorrhoids and cancer. However, the symptoms of hemorrhoids, particularly bleeding, are similar to those of colorectal cancer and other diseases of the digestive system. Therefore, it is important that all symptoms are investigated by a physician specially trained in treating diseases of the colon and rectum and that everyone 50 years or older undergo screening tests for colorectal cancer. Do not rely on over-the-counter medications or other self-treatments. See a colorectal surgeon first so your symptoms can be properly evaluated and effective treatment prescribed.    What is a colon and rectal surgeon?    Colon and rectal surgeons are experts in the surgical and non-surgical treatment of diseases of the colon, rectum and anus. They have completed advanced surgical training in the treatment of these diseases as well as full general  "surgical training. Board-certified colon and rectal surgeons complete residencies in general surgery and colon and rectal surgery, and pass intensive examinations conducted by the American Board of Surgery and the American Board of Colon and Rectal Surgery. They are well-versed in the treatment of both benign and malignant diseases of the colon, rectum and anus and are able to perform routine screening examinations and surgically treat conditions if indicated to do so.    ©2012 American Society of Colon & Rectal Surgeons    Anal Warts    What are anal warts?    Anal warts (also called \"condyloma acuminata\") are a condition that affects the area around and inside the anus. They may also affect the skin of the genital area. They first appear as tiny spots or growths, perhaps as small as the head of a pin, and may grow quite large and cover the entire anal area. Usually, they do not cause pain or discomfort to afflicted individuals and patients may be unaware that the warts are present. Some patients will experience symptoms, such as itching, bleeding, mucus discharge and/or a feeling of a lump or mass in the anal area.    What causes anal warts?    They are caused by the human papilloma virus (HPV), which is transmitted from person to person by direct contact. HPV is considered a sexually transmitted disease (STD). You do not have to have anal intercourse to develop anal warts.    Do anal warts always need to be removed?    Yes. If they are not removed, the warts usually grow larger and multiply. Left untreated, the warts may lead to an increased risk of cancer in the affected area.    What treatments are available?    If warts are very small and are located only on the skin around the anus, they may be treated with a topical medication. They may also be treated by freezing the warts with liquid nitrogen or removed surgically. Surgery typically involves cutting or burning the warts off. While this provides immediate " results, it must be performed using either a local anesthetic - such as novocaine - or a general or spinal anesthetic, depending on the number and exact location of warts being treated. It is important that an internal anal examination with an instrument called an anoscope be done by your treating physician to ensure you do not have any inside the anal canal (internal anal warts). Internal anal warts may not be as suitable for treatment by topical medications, and may need to be treated surgically. Additionally, your physician may wish to examine the entire pelvic region to include the vaginal or penile area to look for other warts that may require treatment.    Must I be hospitalized for surgical treatment?    Surgical treatment of anal warts is usually performed as outpatient surgery.      How much time will I lose from work after surgical treatment?    Most people are moderately uncomfortable for a few days after treatment and pain medication may be prescribed. Depending on the extent of the disease, some people return to work the next day, while others may remain out of work for several days to weeks.    Will a single treatment cure the problem?    When warts are extensive, your surgeon may wish to perform the surgery in stages. Additionally, recurrent warts are common. The virus that causes the warts can live concealed in tissues that appear  normal for several months before another wart develops. As new warts develop, they usually can be treated in the physician's office. Sometimes new warts develop so rapidly that office treatment would be quite uncomfortable. In these situations, a second and, occasionally, third outpatient surgical visit may be recommended.    How long is treatment usually continued?    Follow-up visits are necessary at frequent intervals for several months after all warts appear to be gone, to be certain that no new warts occur.    What can be done to avoid getting these warts again?    In  some cases, warts may recur repeatedly after successful removal, since the virus that causes the warts often persists in a dormant state in body tissues. Discuss with you physician how often you should be evaluated for recurrent warts. Abstain from sexual contact with individuals who have anal (or genital) warts. Since many individuals may be unaware that they suffer from this condition, sexual abstinence, condom protection or limiting sexual contact to single partner will reduce your potential exposure to the contagious virus that causes these warts. As a precaution, sexual partners ought to be checked for warts and other sexual transmitted diseases, even if they have no symptoms.    What is a colon and rectal surgeon?    Colon and rectal surgeons are experts in the surgical and non-surgical treatment of diseases of the colon, rectum and anus. They have completed advanced surgical training in the treatment of these diseases as well as full general surgical training. Board-certified colon and rectal surgeons complete residencies in general surgery and colon and rectal surgery, and pass intensive examinations conducted by the American Board of Surgery and the American Board of Colon and Rectal Surgery. They are well-versed in the treatment of both benign and malignant diseases of the colon, rectum and anus and are able to perform routine screening examinations and surgically treat conditions if indicated to do so.      © 2012 American Society of Colon and Rectal Surgeons

## 2020-11-25 NOTE — LETTER
"November 25, 2020     Edenilson Haile MD  [address]  [address], [address]  [place] [facility] [address]    Patient: Dannie Hardin  YOB: 1950  Date of Visit: 11/25/2020      Dear Dr. Haile:    Thank you for referring Dannie Hardin to me for evaluation. Below are my notes for this consultation.    If you have questions, please do not hesitate to call me. I look forward to following your patient along with you.         Sincerely,        Tayo Hernandez MD        CC: MD Mary Salamanca, Tayo SILVEIRA MD  11/25/2020 11:12 AM  Signed  HISTORY & PHYSICAL EXAM  CC: \"Bleeding hemorrhoid\"    HPI: Dannie is here today complaining of a \"bleeding hemorrhoids\".  He has been seen since 2016.  He has a complex surgical history.  History of chronic small bowel obstructions following a laparoscopic cholecystectomy.  Per the patient he had 2 exploratory surgeries at an outside hospital.  He says the first 1 was laparoscopic with a lysed adhesions.  He then presented again with a bowel obstruction and then had an open surgery where part of his small bowel was removed as well his appendix.  He then presented to our office where he underwent exploratory laparoscopy with open laparotomy, extensive adhesiolysis, small bowel resection and end ileostomy in March 2015.  He was subsequently reversed in July 2015 which required right colon mobilization and more extensive adhesiolysis.  Functional standpoint he says he is doing better.  He is moving his bowels 3-5 times a day, with good control.  He says at times he has looser stool.  He is not taking a fiber supplement daily.  Over the last few months he has been noticing bright red blood after moving his bowels on the toilet paper.  He believes this is from an external hemorrhoid says he has had them for \"50 years\".  He is not having any pain whatsoever.  Denies fever, chills, nausea, vomiting, bloating, abdominal pain, unintentional weight loss, soilage, pain, trauma, " "fecal accidents, urinary incontinence.    The patient has not been here for a number of years.  He notes has had this \"hemorrhoid\" in his perianal region for 50 years now.  Is been bleeding pretty much 3 times a week on the paper when he wipes he sees blood.  Is not really dropping in the bowl.  Does not feel any extra tissue prolapsing he is to push back in.  He does not take a fiber supplementation daily but he does eat a lot of fiber in his diet.  He does not strain to move his bowels.  His GI doctor is Dr. Alondra Yoon.  She has referred her back to us for this perianal lesion.  His next colonoscopy with her will not be until 2025.    Past Medical History:   Diagnosis Date   • Bowel obstruction (CMS/HCC)      Past Surgical History:   Procedure Laterality Date   • COLON SURGERY  03/12/2015   • COLON SURGERY  07/13/2015   • FOOT SURGERY Right    • RESECTION SMALL BOWEL / CLOSURE ILEOSTOMY         Current Outpatient Medications:   •  ascorbic acid, vitamin C, (vitamin C) 100 mg tablet, Take 100 mg by mouth daily., Disp: , Rfl:   •  B-complex with vitamin C tablet, Take 1 tablet by mouth daily., Disp: , Rfl:   •  cholecalciferol, vitamin D3, (VITAMIN D3) 1,000 unit capsule, Take 1,000 Units by mouth daily., Disp: , Rfl:   •  chromium picolinate 1,000 mcg tablet, Take by mouth., Disp: , Rfl:   •  fluticasone propionate (FLONASE) 50 mcg/actuation nasal spray, Administer 1 spray into each nostril daily., Disp: 1 Bottle, Rfl: 5  •  gabapentin (NEURONTIN) 600 mg tablet, Take 1 tablet (600 mg total) by mouth 3 (three) times a day., Disp: 90 tablet, Rfl: 3  •  garlic 1 mg capsule, Take by mouth. 6 capsules daily , Disp: , Rfl:   •  glucosamine sulfate (GLUCOSAMINE) 500 mg tablet, Take by mouth., Disp: , Rfl:   •  lactobacillus 3/FOS/pantethine (PROBIOTIC AND ACIDOPHILUS ORAL), Take by mouth., Disp: , Rfl:   •  magnesium 30 mg tablet, Take 30 mg by mouth 2 (two) times a day., Disp: , Rfl:   •  multivitamin (THERAGRAN) " tablet, take 1 tablet by oral route  every day with food, Disp: , Rfl:   •  niacin 500 mg tablet, Take 500 mg by mouth daily with breakfast., Disp: , Rfl:   •  omega 3-dha-epa-fish oil (FISH OIL) 138-183-1,000 mg capsule, Take by mouth., Disp: , Rfl:   •  thiamine (vitamin B-1) 100 mg tablet, Take 100 mg by mouth daily., Disp: , Rfl:   •  timothy-hor [phone] mg tablet, Take by mouth., Disp: , Rfl:   •  vitamin E acetate (VITAMIN E ORAL), Take 400 Units by mouth 4 (four) times a day.  , Disp: , Rfl:   •  diazePAM (VALIUM) 10 mg tablet, TAKE 1 TABLET BY MOUTH 2 TO 3 TIMES A DAY AS NEEEDED FOR ANXIETY/BACK AND MUSCLE PAIN/SPASM, Disp: , Rfl:   •  DurezoL 0.05 % drops, INSERT 1 DROP IN AFFECTED EYE 4 TIMES A DAY, Disp: , Rfl:   •  DurezoL 0.05 % drops, INSERT 1 DROP IN AFFECTED EYE 4 TIMES A DAY, Disp: , Rfl:   •  sildenafiL (VIAGRA) 100 mg tablet, Take 1 tablet (100 mg total) by mouth daily as needed for erectile dysfunction., Disp: 10 tablet, Rfl: 5   Allergies   Allergen Reactions   • No Known Allergies      Social History     Socioeconomic History   • Marital status:      Spouse name: Not on file   • Number of children: Not on file   • Years of education: Not on file   • Highest education level: Not on file   Occupational History   • Not on file   Social Needs   • Financial resource strain: Not on file   • Food insecurity     Worry: Not on file     Inability: Not on file   • Transportation needs     Medical: Not on file     Non-medical: Not on file   Tobacco Use   • Smoking status: Former Smoker   • Smokeless tobacco: Never Used   Substance and Sexual Activity   • Alcohol use: No   • Drug use: No   • Sexual activity: Not on file   Lifestyle   • Physical activity     Days per week: Not on file     Minutes per session: Not on file   • Stress: Not on file   Relationships   • Social connections     Talks on phone: Not on file     Gets together: Not on file     Attends Latter day service:  Not on file     Active member of club or organization: Not on file     Attends meetings of clubs or organizations: Not on file     Relationship status: Not on file   • Intimate partner violence     Fear of current or ex partner: Not on file     Emotionally abused: Not on file     Physically abused: Not on file     Forced sexual activity: Not on file   Other Topics Concern   • Not on file   Social History Narrative   • Not on file      Family History   Problem Relation Age of Onset   • Hyperlipidemia Biological Mother    • Angina Biological Mother    • Hypertension Biological Mother    • Stroke Biological Father    • Diabetes Biological Father        REVIEW OF SYSTEMS: A complete review of systems was performed, pertinent positive include in the HPI remainder negative.    PHYSICAL EXAM:  GEN: On examination the patient is resting comfortably.  NEURO/PSYCH: Alert and oriented ×3  HEENT: Eyes: Sclera anicteric  CHEST: Equal rise and fall the chest.  No tenderness bilaterally.  SKIN: Warm and moist  NODES: No groin or cervical lymphadenopathy  EXT: No palpable edema of the bilateral lower extremities.  No clubbing.  GI: Abdomen soft, nontender, nondistended.  No palpable liver or spleen edge.  No ventral hernias, mass, or tenderness.  RECTAL: Perianal skin demonstrates a lesion in the right lateral position 1 cm from the anal verge that appears to be a large condyloma and a cauliflower configuration.  It is firm at the base.  Digital exam reveals a normal sphincter tone with no masses palpable.  Diagnostic anoscopy demonstrates enlarged grade 1 internal hemorrhoids without stigmata of bleeding or prolapse in the right anterior, right posterior, and left lateral positions.      ASSESSMENT/PLAN:     Perianal lesion  Patient has a lesion in the right lateral position 1 cm from the anal verge that is about 4 cm in size.  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  That said, this could represent something more  sinister like a cancer.  This needs to be excised.  I plan have the patient on the OR schedule in December 2 to accomplish this.  We will plan for wide local excision and primary closure of the wound.  I discussed with the patient the possibility of wound dehiscence due to the nature of the wound and the location and that would be the case with certainly delayed wound healing but not necessarily his postoperative pain.  We will plan to use Exparel.    First degree hemorrhoids  The patient has significantly enlarged internal hemorrhoids but do not have stigmata of bleeding or prolapse.  At this point I suggested fiber therapy be added to his dietary regimen.  We will need to reassess matters once the perianal lesion is excised and healed to make sure he is not continue to bleed.  If he is, options at that point would be rubber band ligation versus THD.  We will need to discuss this further if his symptoms not improve after excision of this lesion that he has in the perianal region.

## 2020-11-27 ENCOUNTER — APPOINTMENT (OUTPATIENT)
Dept: LAB | Facility: HOSPITAL | Age: 70
End: 2020-11-27
Attending: COLON & RECTAL SURGERY
Payer: COMMERCIAL

## 2020-11-27 ENCOUNTER — APPOINTMENT (OUTPATIENT)
Dept: PREADMISSION TESTING | Facility: HOSPITAL | Age: 70
End: 2020-11-27
Attending: COLON & RECTAL SURGERY
Payer: COMMERCIAL

## 2020-11-27 VITALS
OXYGEN SATURATION: 96 % | DIASTOLIC BLOOD PRESSURE: 77 MMHG | BODY MASS INDEX: 28.04 KG/M2 | TEMPERATURE: 97.5 F | HEART RATE: 77 BPM | HEIGHT: 72 IN | WEIGHT: 207 LBS | SYSTOLIC BLOOD PRESSURE: 139 MMHG | RESPIRATION RATE: 18 BRPM

## 2020-11-27 DIAGNOSIS — Z01.818 PREOP EXAMINATION: Primary | ICD-10-CM

## 2020-11-27 DIAGNOSIS — Z01.818 PREOP TESTING: ICD-10-CM

## 2020-11-27 DIAGNOSIS — Z11.59 SCREENING FOR VIRAL DISEASE: ICD-10-CM

## 2020-11-27 LAB
ALBUMIN SERPL-MCNC: 4 G/DL (ref 3.4–5)
ALP SERPL-CCNC: 76 IU/L (ref 35–126)
ALT SERPL-CCNC: 32 IU/L (ref 16–63)
ANION GAP SERPL CALC-SCNC: 10 MEQ/L (ref 3–15)
AST SERPL-CCNC: 25 IU/L (ref 15–41)
ATRIAL RATE: 69
BASOPHILS # BLD: 0.06 K/UL (ref 0.01–0.1)
BASOPHILS NFR BLD: 1 %
BILIRUB SERPL-MCNC: 1.5 MG/DL (ref 0.3–1.2)
BUN SERPL-MCNC: 21 MG/DL (ref 8–20)
CALCIUM SERPL-MCNC: 9.4 MG/DL (ref 8.9–10.3)
CHLORIDE SERPL-SCNC: 106 MEQ/L (ref 98–109)
CO2 SERPL-SCNC: 24 MEQ/L (ref 22–32)
CREAT SERPL-MCNC: 0.9 MG/DL (ref 0.8–1.3)
DIFFERENTIAL METHOD BLD: NORMAL
EOSINOPHIL # BLD: 0.29 K/UL (ref 0.04–0.54)
EOSINOPHIL NFR BLD: 5 %
ERYTHROCYTE [DISTWIDTH] IN BLOOD BY AUTOMATED COUNT: 13.1 % (ref 11.6–14.4)
GFR SERPL CREATININE-BSD FRML MDRD: >60 ML/MIN/1.73M*2
GLUCOSE SERPL-MCNC: 87 MG/DL (ref 70–99)
HCT VFR BLDCO AUTO: 41.9 % (ref 40.1–51)
HGB BLD-MCNC: 14.1 G/DL (ref 13.7–17.5)
IMM GRANULOCYTES # BLD AUTO: 0.02 K/UL (ref 0–0.08)
IMM GRANULOCYTES NFR BLD AUTO: 0.3 %
LYMPHOCYTES # BLD: 1.55 K/UL (ref 1.2–3.5)
LYMPHOCYTES NFR BLD: 26.5 %
MCH RBC QN AUTO: 28.3 PG (ref 28–33.2)
MCHC RBC AUTO-ENTMCNC: 33.7 G/DL (ref 32.2–36.5)
MCV RBC AUTO: 84.1 FL (ref 83–98)
MONOCYTES # BLD: 0.52 K/UL (ref 0.3–1)
MONOCYTES NFR BLD: 8.9 %
NEUTROPHILS # BLD: 3.41 K/UL (ref 1.7–7)
NEUTS SEG NFR BLD: 58.3 %
NRBC BLD-RTO: 0 %
P AXIS: 31
PDW BLD AUTO: 10.2 FL (ref 9.4–12.4)
PLATELET # BLD AUTO: 203 K/UL (ref 150–350)
POTASSIUM SERPL-SCNC: 4 MEQ/L (ref 3.6–5.1)
PR INTERVAL: 178
PROT SERPL-MCNC: 6.5 G/DL (ref 6–8.2)
QRS DURATION: 96
QT INTERVAL: 406
QTC CALCULATION(BAZETT): 435
R AXIS: 26
RBC # BLD AUTO: 4.98 M/UL (ref 4.5–5.8)
SODIUM SERPL-SCNC: 140 MEQ/L (ref 136–144)
T WAVE AXIS: 18
VENTRICULAR RATE: 69
WBC # BLD AUTO: 5.85 K/UL (ref 3.8–10.5)

## 2020-11-27 PROCEDURE — 93010 ELECTROCARDIOGRAM REPORT: CPT | Performed by: INTERNAL MEDICINE

## 2020-11-27 PROCEDURE — 85025 COMPLETE CBC W/AUTO DIFF WBC: CPT

## 2020-11-27 PROCEDURE — 36415 COLL VENOUS BLD VENIPUNCTURE: CPT

## 2020-11-27 PROCEDURE — 93005 ELECTROCARDIOGRAM TRACING: CPT

## 2020-11-27 PROCEDURE — 80053 COMPREHEN METABOLIC PANEL: CPT

## 2020-11-27 PROCEDURE — U0003 INFECTIOUS AGENT DETECTION BY NUCLEIC ACID (DNA OR RNA); SEVERE ACUTE RESPIRATORY SYNDROME CORONAVIRUS 2 (SARS-COV-2) (CORONAVIRUS DISEASE [COVID-19]), AMPLIFIED PROBE TECHNIQUE, MAKING USE OF HIGH THROUGHPUT TECHNOLOGIES AS DESCRIBED BY CMS-2020-01-R: HCPCS

## 2020-11-27 RX ORDER — OMEPRAZOLE 20 MG/1
20 CAPSULE, DELAYED RELEASE ORAL
COMMUNITY

## 2020-11-27 RX ORDER — MULTIVIT WITH MINERALS/HERBS
1 TABLET ORAL DAILY
COMMUNITY

## 2020-11-27 ASSESSMENT — PAIN SCALES - GENERAL: PAINLEVEL: 0-NO PAIN

## 2020-11-27 ASSESSMENT — ENCOUNTER SYMPTOMS
FEVER: 0
CHILLS: 0

## 2020-11-27 NOTE — H&P
History and Physical  Pre-admission testing         HISTORY OF PRESENT ILLNESS      Dannie Hardin is an 70 y.o. male with a past medical history of  internal hemorrhoids, Perianal lesion ,chronic small bowel obstructions following a laparoscopic cholecystectomy s/p laparoscopic with a lysed adhesions. In March 2015 he underwent exploratory laparoscopy with open laparotomy, extensive adhesiolysis, small bowel resection and end ileostomy. Ileostomy was was subsequently reversed in July 2015. He presents to [facility] for preoperative evaluation prior to planned surgery. He is scheduled for Eua, Exc Of Anal Lesion on 12/02/2020 with Dr Hernandez.     PAST MEDICAL AND SURGICAL HISTORY      Past Medical History:   Diagnosis Date   • Bowel obstruction (CMS/HCC)    • Hepatitis C     Treated       Past Surgical History:   Procedure Laterality Date   • BUNIONECTOMY     • CHOLECYSTECTOMY     • COLON SURGERY  03/12/2015   • COLON SURGERY  07/13/2015   • COLONOSCOPY     • FOOT SURGERY Right    • LUMBAR LAMINECTOMY      L3-L4   • RESECTION SMALL BOWEL / CLOSURE ILEOSTOMY         PROBLEM LIST     Patient Active Problem List   Diagnosis   • Preventative health care   • Encounter for general adult medical examination without abnormal findings   • Neuropathy   • Nondisplaced fracture of fourth metatarsal bone of right foot with routine healing   • Erectile dysfunction   • Pain   • Numbness   • Vitamin B12 deficiency   • Contact dermatitis due to plant   • Granulomatous disease (CMS/HCC)   • Hep C w/o coma, chronic (CMS/HCC)   • Viral hepatitis C   • History of cocaine abuse (CMS/HCC)   • Intestinal adhesions with obstruction (CMS/HCC)   • Small bowel obstruction (CMS/HCC)   • Iris bombe   • Posterior synechiae of iris   • Weakness   • Chronic hepatitis C (CMS/HCC)   • Screening for prostate cancer   • Lumbar spondylosis   • Bunion of right foot   • Perianal lesion   • First degree hemorrhoids       MEDICATIONS        Current  Outpatient Medications:   •  ascorbic acid, vitamin C, (vitamin C) 100 mg tablet, Take 100 mg by mouth daily., Disp: , Rfl:   •  B-complex with vitamin C tablet, Take 1 tablet by mouth daily., Disp: , Rfl:   •  cholecalciferol, vitamin D3, (VITAMIN D3) 1,000 unit capsule, Take 1,000 Units by mouth daily., Disp: , Rfl:   •  chromium picolinate 1,000 mcg tablet, Take by mouth., Disp: , Rfl:   •  fluticasone propionate (FLONASE) 50 mcg/actuation nasal spray, Administer 1 spray into each nostril daily., Disp: 1 Bottle, Rfl: 5  •  gabapentin (NEURONTIN) 600 mg tablet, Take 1 tablet (600 mg total) by mouth 3 (three) times a day., Disp: 90 tablet, Rfl: 3  •  garlic 1 mg capsule, Take by mouth. 6 capsules daily , Disp: , Rfl:   •  glucosamine sulfate (GLUCOSAMINE) 500 mg tablet, Take by mouth., Disp: , Rfl:   •  lactobacillus 3/FOS/pantethine (PROBIOTIC AND ACIDOPHILUS ORAL), Take by mouth., Disp: , Rfl:   •  magnesium 30 mg tablet, Take 30 mg by mouth 2 (two) times a day., Disp: , Rfl:   •  multivitamin (THERAGRAN) tablet, take 1 tablet by oral route  every day with food, Disp: , Rfl:   •  niacin 500 mg tablet, Take 500 mg by mouth daily with breakfast., Disp: , Rfl:   •  omega 3-dha-epa-fish oil (FISH OIL) 138-183-1,000 mg capsule, Take by mouth., Disp: , Rfl:   •  sildenafiL (VIAGRA) 100 mg tablet, Take 1 tablet (100 mg total) by mouth daily as needed for erectile dysfunction., Disp: 10 tablet, Rfl: 5  •  thiamine (vitamin B-1) 100 mg tablet, Take 100 mg by mouth daily., Disp: , Rfl:   •  qvrx-gwfa-wdo-tiw-vho-zdne-hor [phone] mg tablet, Take by mouth., Disp: , Rfl:   •  vitamin E acetate (VITAMIN E ORAL), Take 400 Units by mouth 4 (four) times a day.  , Disp: , Rfl:     ALLERGIES      Allergies   Allergen Reactions   • No Known Allergies        FAMILY HISTORY      Family History   Problem Relation Age of Onset   • Hyperlipidemia Biological Mother    • Angina Biological Mother    • Hypertension Biological Mother     • Stroke Biological Father    • Diabetes Biological Father        SOCIAL HISTORY      Social History     Socioeconomic History   • Marital status:      Spouse name: Not on file   • Number of children: 2   • Years of education: Not on file   • Highest education level: Not on file   Occupational History   • Occupation: retired   Social Needs   • Financial resource strain: Not on file   • Food insecurity     Worry: Not on file     Inability: Not on file   • Transportation needs     Medical: Not on file     Non-medical: Not on file   Tobacco Use   • Smoking status: Former Smoker   • Smokeless tobacco: Never Used   • Tobacco comment: Quit  25 yrs ago   Substance and Sexual Activity   • Alcohol use: No   • Drug use: No   • Sexual activity: Defer   Lifestyle   • Physical activity     Days per week: Not on file     Minutes per session: Not on file   • Stress: Not on file   Relationships   • Social connections     Talks on phone: Not on file     Gets together: Not on file     Attends Latter-day service: Not on file     Active member of club or organization: Not on file     Attends meetings of clubs or organizations: Not on file     Relationship status: Not on file   • Intimate partner violence     Fear of current or ex partner: Not on file     Emotionally abused: Not on file     Physically abused: Not on file     Forced sexual activity: Not on file   Other Topics Concern   • Not on file   Social History Narrative    Lives with spouse in a 2 story homr       REVIEW OF SYSTEMS      Review of Systems   Constitutional: Negative for chills and fever.   Gastrointestinal:        Reports bleeding  Hemorrhoid. Denies pain at this time   All other systems reviewed and are negative.      PHYSICAL EXAMINATION      Visit Vitals  /77 (BP Location: Left upper arm, Patient Position: Sitting)   Pulse 77   Temp 36.4 °C (97.5 °F) (Temporal)   Resp 18   Ht 1.829 m (6')   Wt 93.9 kg (207 lb)   SpO2 96% Comment: Rm air   BMI 28.07  kg/m²     Body mass index is 28.07 kg/m².    Physical Exam  Vitals signs reviewed.   Constitutional:       Appearance: Normal appearance.   HENT:      Head: Normocephalic and atraumatic.      Nose: Nose normal. No congestion.      Mouth/Throat:      Mouth: Mucous membranes are moist.      Pharynx: Oropharynx is clear. No oropharyngeal exudate.   Eyes:      Extraocular Movements: Extraocular movements intact.      Conjunctiva/sclera: Conjunctivae normal.      Pupils: Pupils are equal, round, and reactive to light.   Neck:      Musculoskeletal: Normal range of motion and neck supple.   Cardiovascular:      Rate and Rhythm: Normal rate and regular rhythm.      Pulses: Normal pulses.           Dorsalis pedis pulses are 2+ on the right side and 2+ on the left side.        Posterior tibial pulses are 2+ on the right side and 2+ on the left side.      Heart sounds: Normal heart sounds, S1 normal and S2 normal. No murmur.   Pulmonary:      Effort: Pulmonary effort is normal. No respiratory distress.      Breath sounds: Normal breath sounds. No wheezing.   Abdominal:      General: Bowel sounds are normal.      Palpations: Abdomen is soft.      Tenderness: There is no abdominal tenderness.      Comments: Multiple well-healed surgical abdominal scars   Genitourinary:     Comments: Examination is deferred  Musculoskeletal: Normal range of motion.      Right lower le+ Edema present.      Comments: + Edema at the right ankle. Patient reports recent bunionectomy   Skin:     General: Skin is warm and dry.   Neurological:      General: No focal deficit present.      Mental Status: He is alert and oriented to person, place, and time.   Psychiatric:         Mood and Affect: Mood normal.         Behavior: Behavior normal.            TABBY Bonilla  2020

## 2020-11-27 NOTE — PRE-PROCEDURE INSTRUCTIONS
1. We will call you between 3 pm and 7 pm on December 1, 2020 to determine that arrival time for your procedure. If you do not hear by 6PM. Please call [phone] for arrival time.    2. Please report to Main Entrance near Parking lot A, walk into main lobby and report to the admission desk on the first floor on the day of your procedure.       3. Please follow the fasting guidelines: INSTRUCTION FROM DR Hernandez REGARDING FASTING ON THE NIGHT BEFORE YOUR SURGERY         4. Early on the morning of the procedure please take your usual dose of the listed medications gabapentin (NEURONTIN) OMEPRAZOLE with a sip of water:    YOU MAY USE YOUR FLONASE    AVOID ASPIRIN, MOTRIN, ADVIL, ALEVE, HERBAL SUPPLEMENTS, VITAMIN E, FISH OIL, garlic  1 WEEK PRIOR TO SURGERY    YOU MAY TAKE TYLENOL FOR PAIN     5. Other Instructions: You may brush your teeth the morning of the procedure. Rinse and spit, do not swallow.  Bring a list of your medications with dosages with you.  Use surgical wash as directed.    6. If you develop a cold, cough, fever, rash, or other symptom prior to the data of the procedure, please report it to your physician immediately.   7. If you need to cancel the procedure for any reason, please contact your physician or call the unit listed above.   8. Make arrangements to have someone drive you home from the procedure. If you have not arranged for transportation home, your surgery may be cancelled.    9. You may not take public transportation unless accompanied by a responsible person.   10. You may not drive a car or operate complex or potentially dangerous machinery for 24 hours following anesthesia and/or sedation.   11. If it is medically necessary for you to have a longer stay, you will be informed as soon as the decision is made.   12. Do not wear or bring anything of value to the hospital including jewelry of any kind, money, or wallet. Do not wear make-up or contact lenses. DO bring your glasses and  hearing aid. DO NOT BRING MEDICATIONS FROM HOME.   13. No lotion, creams, powders, or oils on skin the morning of procedure    14. Dress in comfortable clothes.   15.  If instructed, please bring a copy of your Advanced Directive (Living Will/Durable Power of ) on the day of your procedure.      Pre operative instructions given as per protocol.  Form explained by: TABBY Bonilla     I have read and understand the above information. I have had sufficient opportunity to ask questions I might have and they have been answered to my satisfaction. I agree to comply with the Patient Responsibilities listed above and have received a copy of this form.

## 2020-11-29 LAB — SARS-COV-2 RNA RESP QL NAA+PROBE: NOT DETECTED

## 2020-12-01 ENCOUNTER — ANESTHESIA (OUTPATIENT)
Dept: OPERATING ROOM | Facility: HOSPITAL | Age: 70
Setting detail: HOSPITAL OUTPATIENT SURGERY
End: 2020-12-01
Payer: COMMERCIAL

## 2020-12-01 ENCOUNTER — TELEPHONE (OUTPATIENT)
Dept: COLORECTAL SURGERY | Facility: CLINIC | Age: 70
End: 2020-12-01

## 2020-12-01 ENCOUNTER — HOSPITAL ENCOUNTER (OUTPATIENT)
Facility: HOSPITAL | Age: 70
Setting detail: HOSPITAL OUTPATIENT SURGERY
Discharge: HOME | End: 2020-12-01
Attending: COLON & RECTAL SURGERY | Admitting: COLON & RECTAL SURGERY
Payer: COMMERCIAL

## 2020-12-01 VITALS
DIASTOLIC BLOOD PRESSURE: 68 MMHG | OXYGEN SATURATION: 96 % | SYSTOLIC BLOOD PRESSURE: 167 MMHG | HEART RATE: 80 BPM | TEMPERATURE: 97.7 F | RESPIRATION RATE: 15 BRPM

## 2020-12-01 DIAGNOSIS — A63.0 ANAL CONDYLOMA: ICD-10-CM

## 2020-12-01 PROCEDURE — 0DBQ0ZX EXCISION OF ANUS, OPEN APPROACH, DIAGNOSTIC: ICD-10-PCS | Performed by: COLON & RECTAL SURGERY

## 2020-12-01 PROCEDURE — 25000000 HC PHARMACY GENERAL: Performed by: PHYSICIAN ASSISTANT

## 2020-12-01 PROCEDURE — 63600000 HC DRUGS/DETAIL CODE: Performed by: COLON & RECTAL SURGERY

## 2020-12-01 PROCEDURE — C9290 INJ, BUPIVACAINE LIPOSOME: HCPCS | Performed by: COLON & RECTAL SURGERY

## 2020-12-01 PROCEDURE — 25800000 HC PHARMACY IV SOLUTIONS: Performed by: NURSE ANESTHETIST, CERTIFIED REGISTERED

## 2020-12-01 PROCEDURE — 71000001 HC PACU PHASE 1 INITIAL 30MIN: Performed by: COLON & RECTAL SURGERY

## 2020-12-01 PROCEDURE — 63600000 HC DRUGS/DETAIL CODE: Performed by: PHYSICIAN ASSISTANT

## 2020-12-01 PROCEDURE — 25000000 HC PHARMACY GENERAL: Performed by: NURSE ANESTHETIST, CERTIFIED REGISTERED

## 2020-12-01 PROCEDURE — 63600000 HC DRUGS/DETAIL CODE: Performed by: NURSE ANESTHETIST, CERTIFIED REGISTERED

## 2020-12-01 PROCEDURE — 27200000 HC STERILE SUPPLY: Performed by: COLON & RECTAL SURGERY

## 2020-12-01 PROCEDURE — 25000000 HC PHARMACY GENERAL: Performed by: COLON & RECTAL SURGERY

## 2020-12-01 PROCEDURE — 71000012 HC PACU PHASE 2 EA ADDL MIN: Performed by: COLON & RECTAL SURGERY

## 2020-12-01 PROCEDURE — 71000011 HC PACU PHASE 1 EA ADDL MIN: Performed by: COLON & RECTAL SURGERY

## 2020-12-01 PROCEDURE — 37000001 HC ANESTHESIA GENERAL: Performed by: COLON & RECTAL SURGERY

## 2020-12-01 PROCEDURE — 71000002 HC PACU PHASE 2 INITIAL 30MIN: Performed by: COLON & RECTAL SURGERY

## 2020-12-01 PROCEDURE — 46922 EXCISION OF ANAL LESION(S): CPT | Performed by: COLON & RECTAL SURGERY

## 2020-12-01 PROCEDURE — 36000003 HC OR LEVEL 3 INITIAL 30MIN: Performed by: COLON & RECTAL SURGERY

## 2020-12-01 PROCEDURE — 88305 TISSUE EXAM BY PATHOLOGIST: CPT | Performed by: COLON & RECTAL SURGERY

## 2020-12-01 PROCEDURE — 36000013 HC OR LEVEL 3 EA ADDL MIN: Performed by: COLON & RECTAL SURGERY

## 2020-12-01 RX ORDER — ONDANSETRON HYDROCHLORIDE 2 MG/ML
INJECTION, SOLUTION INTRAVENOUS AS NEEDED
Status: DISCONTINUED | OUTPATIENT
Start: 2020-12-01 | End: 2020-12-01 | Stop reason: SURG

## 2020-12-01 RX ORDER — IBUPROFEN 200 MG
16-32 TABLET ORAL AS NEEDED
Status: DISCONTINUED | OUTPATIENT
Start: 2020-12-01 | End: 2020-12-01 | Stop reason: HOSPADM

## 2020-12-01 RX ORDER — DEXTROSE 50 % IN WATER (D50W) INTRAVENOUS SYRINGE
25 AS NEEDED
Status: DISCONTINUED | OUTPATIENT
Start: 2020-12-01 | End: 2020-12-01 | Stop reason: HOSPADM

## 2020-12-01 RX ORDER — MIDAZOLAM HYDROCHLORIDE 2 MG/2ML
INJECTION, SOLUTION INTRAMUSCULAR; INTRAVENOUS AS NEEDED
Status: DISCONTINUED | OUTPATIENT
Start: 2020-12-01 | End: 2020-12-01 | Stop reason: SURG

## 2020-12-01 RX ORDER — GLYCOPYRROLATE 0.6MG/3ML
SYRINGE (ML) INTRAVENOUS AS NEEDED
Status: DISCONTINUED | OUTPATIENT
Start: 2020-12-01 | End: 2020-12-01 | Stop reason: SURG

## 2020-12-01 RX ORDER — BUPIVACAINE HYDROCHLORIDE AND EPINEPHRINE 2.5; 5 MG/ML; UG/ML
INJECTION, SOLUTION EPIDURAL; INFILTRATION; INTRACAUDAL; PERINEURAL AS NEEDED
Status: DISCONTINUED | OUTPATIENT
Start: 2020-12-01 | End: 2020-12-01 | Stop reason: HOSPADM

## 2020-12-01 RX ORDER — ACETAMINOPHEN 325 MG/1
650 TABLET ORAL EVERY 6 HOURS PRN
Start: 2020-12-01 | End: 2020-12-31

## 2020-12-01 RX ORDER — METRONIDAZOLE 500 MG/100ML
500 INJECTION, SOLUTION INTRAVENOUS
Status: COMPLETED | OUTPATIENT
Start: 2020-12-01 | End: 2020-12-01

## 2020-12-01 RX ORDER — DEXTROSE 40 %
15-30 GEL (GRAM) ORAL AS NEEDED
Status: DISCONTINUED | OUTPATIENT
Start: 2020-12-01 | End: 2020-12-01 | Stop reason: HOSPADM

## 2020-12-01 RX ORDER — ROCURONIUM BROMIDE 10 MG/ML
INJECTION, SOLUTION INTRAVENOUS AS NEEDED
Status: DISCONTINUED | OUTPATIENT
Start: 2020-12-01 | End: 2020-12-01 | Stop reason: SURG

## 2020-12-01 RX ORDER — DEXAMETHASONE SODIUM PHOSPHATE 4 MG/ML
INJECTION, SOLUTION INTRA-ARTICULAR; INTRALESIONAL; INTRAMUSCULAR; INTRAVENOUS; SOFT TISSUE AS NEEDED
Status: DISCONTINUED | OUTPATIENT
Start: 2020-12-01 | End: 2020-12-01 | Stop reason: SURG

## 2020-12-01 RX ORDER — OXYCODONE HYDROCHLORIDE 5 MG/1
5-10 TABLET ORAL EVERY 4 HOURS PRN
Qty: 40 TABLET | Refills: 0 | Status: SHIPPED | OUTPATIENT
Start: 2020-12-01 | End: 2020-12-08

## 2020-12-01 RX ORDER — CEFAZOLIN SODIUM 2 G/50ML
2 SOLUTION INTRAVENOUS
Status: COMPLETED | OUTPATIENT
Start: 2020-12-01 | End: 2020-12-01

## 2020-12-01 RX ORDER — PROPOFOL 10 MG/ML
INJECTION, EMULSION INTRAVENOUS AS NEEDED
Status: DISCONTINUED | OUTPATIENT
Start: 2020-12-01 | End: 2020-12-01 | Stop reason: SURG

## 2020-12-01 RX ORDER — NEOSTIGMINE METHYLSULFATE 1 MG/ML
INJECTION INTRAVENOUS AS NEEDED
Status: DISCONTINUED | OUTPATIENT
Start: 2020-12-01 | End: 2020-12-01 | Stop reason: SURG

## 2020-12-01 RX ORDER — LIDOCAINE HYDROCHLORIDE 10 MG/ML
INJECTION, SOLUTION INFILTRATION; PERINEURAL AS NEEDED
Status: DISCONTINUED | OUTPATIENT
Start: 2020-12-01 | End: 2020-12-01 | Stop reason: SURG

## 2020-12-01 RX ORDER — FENTANYL CITRATE 50 UG/ML
50 INJECTION, SOLUTION INTRAMUSCULAR; INTRAVENOUS
Status: DISCONTINUED | OUTPATIENT
Start: 2020-12-01 | End: 2020-12-01 | Stop reason: HOSPADM

## 2020-12-01 RX ORDER — HYDROMORPHONE HYDROCHLORIDE 1 MG/ML
0.5 INJECTION, SOLUTION INTRAMUSCULAR; INTRAVENOUS; SUBCUTANEOUS
Status: DISCONTINUED | OUTPATIENT
Start: 2020-12-01 | End: 2020-12-01 | Stop reason: HOSPADM

## 2020-12-01 RX ORDER — ADHESIVE BANDAGE
30 BANDAGE TOPICAL DAILY
Qty: 450 ML | Refills: 0 | Status: SHIPPED | OUTPATIENT
Start: 2020-12-01 | End: 2021-10-28 | Stop reason: ALTCHOICE

## 2020-12-01 RX ORDER — SODIUM CHLORIDE 9 MG/ML
INJECTION, SOLUTION INTRAVENOUS CONTINUOUS PRN
Status: DISCONTINUED | OUTPATIENT
Start: 2020-12-01 | End: 2020-12-01 | Stop reason: SURG

## 2020-12-01 RX ORDER — FENTANYL CITRATE 50 UG/ML
INJECTION, SOLUTION INTRAMUSCULAR; INTRAVENOUS AS NEEDED
Status: DISCONTINUED | OUTPATIENT
Start: 2020-12-01 | End: 2020-12-01 | Stop reason: SURG

## 2020-12-01 RX ADMIN — ROCURONIUM BROMIDE 40 MG: 10 INJECTION, SOLUTION INTRAVENOUS at 16:05

## 2020-12-01 RX ADMIN — LIDOCAINE HYDROCHLORIDE 5 ML: 10 INJECTION, SOLUTION INFILTRATION; PERINEURAL at 16:04

## 2020-12-01 RX ADMIN — ONDANSETRON HYDROCHLORIDE 4 MG: 2 SOLUTION INTRAMUSCULAR; INTRAVENOUS at 16:06

## 2020-12-01 RX ADMIN — GLYCOPYRROLATE 0.8 MG: 0.2 INJECTION, SOLUTION INTRAMUSCULAR; INTRAVITREAL at 16:46

## 2020-12-01 RX ADMIN — PROPOFOL INJECTABLE EMULSION 200 MG: 10 INJECTION, EMULSION INTRAVENOUS at 16:04

## 2020-12-01 RX ADMIN — DEXAMETHASONE SODIUM PHOSPHATE 4 MG: 4 INJECTION, SOLUTION INTRAMUSCULAR; INTRAVENOUS at 16:05

## 2020-12-01 RX ADMIN — NEOSTIGMINE METHYLSULFATE 5 MG: 1 INJECTION, SOLUTION INTRAVENOUS at 16:46

## 2020-12-01 RX ADMIN — FENTANYL CITRATE 100 MCG: 50 INJECTION, SOLUTION INTRAMUSCULAR; INTRAVENOUS at 16:04

## 2020-12-01 RX ADMIN — CEFAZOLIN 2 G: 330 INJECTION, POWDER, FOR SOLUTION INTRAMUSCULAR; INTRAVENOUS at 15:59

## 2020-12-01 RX ADMIN — SODIUM CHLORIDE: 9 INJECTION, SOLUTION INTRAVENOUS at 16:00

## 2020-12-01 RX ADMIN — MIDAZOLAM HYDROCHLORIDE 2 MG: 1 INJECTION, SOLUTION INTRAMUSCULAR; INTRAVENOUS at 16:02

## 2020-12-01 RX ADMIN — METRONIDAZOLE 500 MG: 500 INJECTION, SOLUTION INTRAVENOUS at 16:10

## 2020-12-01 NOTE — BRIEF OP NOTE
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    Specimens:                Order Name Source Comment Collection Info Order Time   PATHOLOGY TISSUE EXAM Anus Pre-op diagnosis:  a63.0 Collected By: Tayo Hernandez MD 12/1/2020  4:36 PM     Release to patient   Immediate              Drains: * No LDAs found *    Implants: * No implants in log *           Complications:  None; patient tolerated the procedure well.           Disposition: PACU - hemodynamically stable.           Condition: stable    Tayo Hernandez MD  Phone Number: [phone]

## 2020-12-01 NOTE — OR SURGEON
Pre-Procedure patient identification:  I am the primary operating surgeon/proceduralist and I have identified the patient on 12/01/20 at 3:52 PM Tayo Hernandez MD  Phone Number: [phone]

## 2020-12-01 NOTE — DISCHARGE INSTRUCTIONS
Colorectal Surgery Discharge Instructions:    Follow-up: Please call Dr. Hernandez's office at [phone] upon discharge to schedule your follow-up appointment for 2 weeks. You will discuss the pathology from the tissue excised. Please call Dr. Hernandez's office if you have any questions/concerns. Call if you experience the following: fevers (>101)/chills, nausea, vomiting, difficulty urinating, or diarrhea.Please follow-up with your primary care physician within 1 month of discharge from the hospital to update them regarding your hospital stay.    Please follow-up with your primary care physician within 2 weeks of discharge from the hospital to update them regarding your hospital stay.     Medications: Resume all of your previous home medications unless otherwise indicated.   Please follow the following regimen:  · On the day of your surgery, take: 4 DUCOLAX tabs and 2 oz MILK OF MAGNESIA  · On the day following your surgery until follow up with Dr. Hernandez, take: 1 oz MILK OF MAGNESIA daily, 1 dose FIBER daily, 64oz WATER daily.  · If you do not have a bowel movement daily, please add 1 oz of MILK OF MAGNESIA (to equal 2oz on that day).  · If you experience diarrhea, please take 1/2 oz of Milk of Magnesia  · If you do not have a bowel movement two days in a row, PLEASE CALL THE OFFICE.    Pain: Take the prescribed narcotic 1 tablet every 6 hours as needed for severe pain. Alternatively, take TYLENOL 650mg every 4 hours as needed for mild pain. Please do not take both medications together.     Diet: High fiber diet.    Wound: You have a packing in place that will fall out with or before your first bowel movement. Please take sitz baths with warm water for 15 minutes three times a day and after each bowel movement. Some oozing is to be expected. Please try to keep off your feet as much as possible for 72 hours after the surgery.    Dr. Donald Kulkarni and Dr. Tayo Hernandez  [facility]  Building  [address]  [address] [facility] [place] [address]  [phone]

## 2020-12-01 NOTE — ANESTHESIA POSTPROCEDURE EVALUATION
Patient: Dannie LEHMAN Drain    Procedure Summary     Date: 12/01/20 Room / Location: [facility] OR 12 / [facility] OR    Anesthesia Start: 1600 Anesthesia Stop: 1701    Procedure: EUA, Full thickness Exc of anal lesion (N/A Anus) Diagnosis:       Anal condyloma      (a63.0)    Surgeon: Tayo Hernandez MD Responsible Provider: Jade Prakash MD    Anesthesia Type: MAC, general ASA Status: 2          Anesthesia Type: MAC, general  PACU Vitals  12/1/2020 1655 - 12/1/2020 1701      12/1/2020  1700             BP:  (!) 167/84  (Pended)     Temp:  36.3 °C (97.4 °F)  (Pended)     Pulse:  90  (Pended)             Anesthesia Post Evaluation    Pain management: satisfactory to patient  Mode of pain management: IV medication  Patient location during evaluation: PACU  Patient participation: complete - patient participated  Level of consciousness: awake and alert  Cardiovascular status: acceptable  Airway Patency: adequate  Respiratory status: acceptable  Hydration status: stable  Anesthetic complications: no

## 2020-12-01 NOTE — ANESTHESIA PROCEDURE NOTES
Airway  Urgency: elective    Start Time: 12/1/2020 4:07 PM  Airway not difficult    General Information and Staff    Patient location during procedure: OR  Anesthesiologist: Jade Prakash MD  Resident/CRNA: Brandy Rutherford CRNA  Performed: resident/CRNA     Indications and Patient Condition  Indications for airway management: anesthesia  Sedation level: general  Preoxygenated: yes  Mask difficulty assessment: 1 - vent by mask    Final Airway Details  Final airway type: endotracheal airway      Successful airway: ETT  Cuffed: yes   Successful intubation technique: direct laryngoscopy  Endotracheal tube insertion site: oral  Blade: Daryl  Blade size: #4  ETT size (mm): 7.5  Cormack-Lehane Classification: grade I - full view of glottis  Placement verified by: chest auscultation and capnometry   Measured from: lips  ETT to lips (cm): 21  Number of attempts at approach: 1  Number of other approaches attempted: 0  Atraumatic airway insertion

## 2020-12-01 NOTE — ANESTHESIA PREPROCEDURE EVALUATION
Relevant Problems   GASTROINTESTINAL   (+) Chronic hepatitis C (CMS/HCC)   (+) Hep C w/o coma, chronic (CMS/HCC)   (+) Viral hepatitis C      MUSCULOSKELETAL   (+) Lumbar spondylosis      NEUROLOGY   (+) History of cocaine abuse (CMS/HCC)   (+) Neuropathy      Other   (+) Chronic hepatitis C (CMS/HCC)   (+) Hep C w/o coma, chronic (CMS/HCC)   (+) Viral hepatitis C     EUA, Exc of anal lesion    Anesthesia ROS/MED HX    Anesthesia History - neg  Pulmonary - neg  Cardiovascular- neg   Covid19 Test Reviewed and ECG reviewed   Normal ECG    Hematological - neg  GI/Hepatic   Hepatitis  Renal Disease- neg  Endo/Other- neg  Body Habitus: Normal       Past Surgical History:   Procedure Laterality Date   • BUNIONECTOMY     • CHOLECYSTECTOMY     • COLON SURGERY  03/12/2015   • COLON SURGERY  07/13/2015   • COLONOSCOPY     • FOOT SURGERY Right    • LUMBAR LAMINECTOMY      L3-L4   • RESECTION SMALL BOWEL / CLOSURE ILEOSTOMY         Physical Exam    Airway   Mallampati: II   TM distance: >3 FB   Neck ROM: full  Cardiovascular - normal   Rhythm: regular   Rate: normalPulmonary - normal   clear to auscultation  Dental    Teeth Problems: implants        Anesthesia Plan    Plan: MAC and general   ASA 2  Comments:    Plan: MAC with GA backup

## 2020-12-01 NOTE — ANESTHESIOLOGIST PRE-PROCEDURE ATTESTATION
Pre-Procedure Patient Identification:  I am the Primary Anesthesiologist and have identified the patient on 12/01/20 at 3:02 PM.   I have confirmed the following procedure(s) EUA, Exc of anal lesion will be performed by the following surgeon/proceduralist Tayo Hernandez MD.

## 2020-12-03 NOTE — OP NOTE
Dannie C Drain  1950  [id number]  12/03/20        SURGEON: Tayo Hernandez MD    ASSISTANT: Marlene Young MD    PREOPERATIVE DIAGNOSIS: Anal lesion right lateral position at anal margin    POSTOPERATIVE DIAGNOSIS: Anal lesion right lateral position at anal margin    PROCEDURE PERFORMED:    1) rectal examination under anesthesia with full-thickness excision of anal margin lesion marked short Vicryl stitch superior long Vicryl stitch lateral and Prolene stitch at anal canal margin    SPECIMENS:    1) anal canal lesion    ANESTHESIA: General, local with Exparel    COMPLICATIONS: None.    EBL: 10 cc    OPERATIVE INDICATIONS: Patient is a very nice 70-year-old man who was seen in the office and found to have an anal lesion that would be consistent with condyloma.  Is been there for quite some time, it was firm, I discussed he would need to have this removed to rule out any invasive cancer component.  Discussed the risk of the procedure including bleeding, infection, need for additional procedures.  He understood and would like to proceed.    OPERATIVE DETAIL: The patient was brought to the operating table and placed in supine position.  He was identified and general anesthesia was induced.  His and placed in prone jackknife position and his anal area was prepped and draped in usual fashion using Betadine.  Timeout was performed and a rectal exam was done.  The lesion was seen to be at the anal margin in the right lateral position.  The area was anesthetized with Marcaine with Epinephrine and Then Sharply Incised with a 15 Blade Circumferentially around the Lesion.  In a Full-Thickness Fashion It Was Dissected Free from the Sphincter Complex.  There Was No Evidence of Invasion to the Muscle.  Once This Was Done, the Specimen Was Passed off the Field after Being Marked with a Vicryl Stitch in the Superior Aspect, a Long Vicryl Stitch in the Lateral Aspect, and a Prolene Stitch in the Medial, Anal Canal margin.   Hemostasis was obtained with Bovie cautery.  The wound was then closed with a 2-0 Vicryl stitch in running fashion.  The patient tolerated this well, a perianal field block and pudendal nerve block was performed using Exparel.  A piece of Gelfoam was placed for additional stasis.  At conclusion all sharps, sponge, and instrument counts were correct.  The patient tolerated the procedure well and was transferred to the PACU in extubated and stable condition.

## 2020-12-08 LAB
CASE RPRT: NORMAL
CLINICAL INFO: NORMAL
PATH REPORT.FINAL DX SPEC: NORMAL
PATH REPORT.FINAL DX SPEC: NORMAL
PATH REPORT.GROSS SPEC: NORMAL

## 2020-12-14 ENCOUNTER — DOCUMENTATION (OUTPATIENT)
Dept: COLORECTAL SURGERY | Facility: CLINIC | Age: 70
End: 2020-12-14

## 2020-12-14 ENCOUNTER — OFFICE VISIT (OUTPATIENT)
Dept: COLORECTAL SURGERY | Facility: CLINIC | Age: 70
End: 2020-12-14
Payer: COMMERCIAL

## 2020-12-14 VITALS — HEIGHT: 72 IN | BODY MASS INDEX: 27.09 KG/M2 | WEIGHT: 200 LBS

## 2020-12-14 DIAGNOSIS — K62.9 PERIANAL LESION: Primary | ICD-10-CM

## 2020-12-14 DIAGNOSIS — K64.0 FIRST DEGREE HEMORRHOIDS: ICD-10-CM

## 2020-12-14 DIAGNOSIS — D01.3 ANAL INTRAEPITHELIAL NEOPLASIA III (AIN III): ICD-10-CM

## 2020-12-14 PROCEDURE — 99024 POSTOP FOLLOW-UP VISIT: CPT | Performed by: COLON & RECTAL SURGERY

## 2020-12-14 NOTE — LETTER
December 14, 2020     Edenilson Haile MD  [address]  [address]  [facility] [address]    Patient: Dannie LEHMAN Drain  YOB: 1950  Date of Visit: 12/14/2020      Dear Dr. Haile:    Thank you for referring Dannie LEHMAN Drain to me for evaluation. Below are my notes for this consultation.    If you have questions, please do not hesitate to call me. I look forward to following your patient along with you.         Sincerely,        Tayo Hernandez MD        CC: No Recipients  Tayo Hernandez MD  12/14/2020 11:45 AM  Signed  HISTORY & PHYSICAL EXAM    HPI: Patient to be seen today for his first postoperative visit after full-thickness excision of anal lesion.  Is been doing well.  He has no pain really, the patient notes in fact he had no real pain after surgery.  He is moving his bowels without difficulty, taking the milk of magnesia and the fiber is caused him to have some liquidy movements and some occasional incontinence with gas.    Past Medical History:   Diagnosis Date   • Bowel obstruction (CMS/HCC)    • Hepatitis C     Treated     Past Surgical History:   Procedure Laterality Date   • BUNIONECTOMY     • CHOLECYSTECTOMY     • COLON SURGERY  03/12/2015   • COLON SURGERY  07/13/2015   • COLONOSCOPY     • FOOT SURGERY Right    • LUMBAR LAMINECTOMY      L3-L4   • RESECTION SMALL BOWEL / CLOSURE ILEOSTOMY         Current Outpatient Medications:   •  acetaminophen (TylenoL) 325 mg tablet, Take 2 tablets (650 mg total) by mouth every 6 (six) hours as needed for mild pain or moderate pain., Disp: , Rfl:   •  ascorbic acid, vitamin C, (vitamin C) 1,000 mg tablet, Take 100 mg by mouth daily.  , Disp: , Rfl:   •  b complex vitamins (B COMPLEX-VITAMIN B12) tablet, Take 1 tablet by mouth daily., Disp: , Rfl:   •  cholecalciferol, vitamin D3, (VITAMIN D3) 1,000 unit capsule, Take 1,000 Units by mouth daily., Disp: , Rfl:   •  chromium picolinate 1,000 mcg tablet, Take 1 capsule by mouth daily.  Stopped for surgery , Disp: , Rfl:   •  cyanocobalamin, vitamin B-12, (VITAMIN B-12 ORAL), Take 100 mcg by mouth daily., Disp: , Rfl:   •  fluticasone propionate (FLONASE) 50 mcg/actuation nasal spray, Administer 1 spray into each nostril daily., Disp: 1 Bottle, Rfl: 5  •  gabapentin (NEURONTIN) 600 mg tablet, Take 1 tablet (600 mg total) by mouth 3 (three) times a day., Disp: 90 tablet, Rfl: 3  •  garlic 1,000 mg capsule, Take by mouth daily. 6 capsules daily ( Stopped for surgery) , Disp: , Rfl:   •  glucos sul 2KCl/msm/chond/C/Mn (GLUCOSAMINE CHONDROITIN ORAL), Take 1 tablet by mouth daily. Stopped for surgery, Disp: , Rfl:   •  lactobacillus 3/FOS/pantethine (PROBIOTIC AND ACIDOPHILUS ORAL), Take 1 capsule by mouth daily.  , Disp: , Rfl:   •  magnesium 30 mg tablet, Take 30 mg by mouth 2 (two) times a day., Disp: , Rfl:   •  magnesium hydroxide (magnesium hydroxide) 400 mg/5 mL suspension, Take 30 mL by mouth daily for 15 days., Disp: 450 mL, Rfl: 0  •  multivitamin (THERAGRAN) tablet, Take 1 tablet by mouth daily.  , Disp: , Rfl:   •  niacin 500 mg tablet, Take 500 mg by mouth daily with breakfast., Disp: , Rfl:   •  omega 3-dha-epa-fish oil (FISH OIL) 138-183-1,000 mg capsule, Take 1 capsule by mouth daily. Stopped for surgery , Disp: , Rfl:   •  omeprazole (PriLOSEC) 20 mg capsule, Take 20 mg by mouth daily before breakfast., Disp: , Rfl:   •  thiamine (vitamin B-1) 100 mg tablet, Take 100 mg by mouth daily., Disp: , Rfl:   •  vitamin E acetate (VITAMIN E ORAL), Take 400 Units by mouth 4 (four) times a day.  , Disp: , Rfl:   •  sildenafiL (VIAGRA) 100 mg tablet, Take 1 tablet (100 mg total) by mouth daily as needed for erectile dysfunction., Disp: 10 tablet, Rfl: 5   Allergies   Allergen Reactions   • No Known Allergies      Social History     Socioeconomic History   • Marital status:      Spouse name: Not on file   • Number of children: 2   • Years of education: Not on file   • Highest education  level: Not on file   Occupational History   • Occupation: retired   Social Needs   • Financial resource strain: Not on file   • Food insecurity     Worry: Not on file     Inability: Not on file   • Transportation needs     Medical: Not on file     Non-medical: Not on file   Tobacco Use   • Smoking status: Former Smoker   • Smokeless tobacco: Never Used   • Tobacco comment: Quit  25 yrs ago   Substance and Sexual Activity   • Alcohol use: No   • Drug use: No   • Sexual activity: Defer   Lifestyle   • Physical activity     Days per week: Not on file     Minutes per session: Not on file   • Stress: Not on file   Relationships   • Social connections     Talks on phone: Not on file     Gets together: Not on file     Attends [employer] service: Not on file     Active member of club or organization: Not on file     Attends meetings of clubs or organizations: Not on file     Relationship status: Not on file   • Intimate partner violence     Fear of current or ex partner: Not on file     Emotionally abused: Not on file     Physically abused: Not on file     Forced sexual activity: Not on file   Other Topics Concern   • Not on file   Social History Narrative    Lives with spouse in a 2 story homr      Family History   Problem Relation Age of Onset   • Hyperlipidemia Biological Mother    • Angina Biological Mother    • Hypertension Biological Mother    • Stroke Biological Father    • Diabetes Biological Father        REVIEW OF SYSTEMS: Unchanged    PHYSICAL EXAM:  GEN: On examination the patient is resting comfortably.  NEURO/PSYCH: Alert and oriented ×3  HEENT: Eyes: Sclera anicteric  CHEST: Equal rise and fall the chest.  No tenderness bilaterally.  SKIN: Warm and moist  NODES: No groin or cervical lymphadenopathy  EXT: No palpable edema of the bilateral lower extremities.  No clubbing.  GI: Abdomen soft, nontender, nondistended.  No palpable liver or spleen edge.  No ventral hernias, mass, or tenderness.  RECTAL: Perianal  skin demonstrates a nicely healing lesion in the right lateral position.  Digital is reveals normal sphincter tone no masses palpable.      ASSESSMENT/PLAN:     Anal intraepithelial neoplasia III (AIN III)  Patient had AIN 1, 2, and 3 on the resected anal lesion.  There is no evidence of invasive cancer.  His wound is healed nicely.  Going to see him back in the office in 6 months time for continued surveillance to reassure that there is no recurrence.  After that he should be seen on an annual basis with a Pap smear.  If the Pap smear turns positive any point or if he has any visible lesion, high resolution anoscopy could be considered.

## 2020-12-14 NOTE — PATIENT INSTRUCTIONS
"HIgh Fiber Diet    Fiber goal:  30 - 35 grams of fiber per day  This is equivalent to 10 apples or 5 cups of white rice or 2 1/2 cups of solano beans. Most of us don't eat this way, so we need to take a supplement.    Types of fiber  Insoluble fiber does not dissolve in water.    • It is found in wheat, rye, bran, and other grains, and it is the fiber found in most vegetables.  • Psyllium is a common source of insoluble fiber in fiber supplements.  • As a supplement, this makes a thick solution which should be drunk at once, not sipped on from the cup.  • Insoluble fiber is the best stool bulking agent and is less likely to cause gas because the intestinal bacteria do not digest it so they do not produce more gas.  • Examples of insoluble fiber products (each has 5 grams of fiber per teaspoon)  o Konsyl: Fiber source is psyllium. It is unflavored, so it needs to be mixed with juice or another flavored non-carbonated beverage rather than water.  o Citrucel: Fiber source is hemicelluloses. It is flavored  o Metamucil: Fiber source is psyllium (and some insoluble fiber). It is flavored. Get the \"original texture\".  o FiberCon: Fiber source is polycarbophil    Soluble fiber dissolves in water and forms a gelatinous substance in the bowel.  • It is found in oatmeal, oat bran, fruit, barley, and legumes (beans).  • It is digested by intestinal bacteria and therefore is more likely to increase intestinal gas than insoluble fiber.  • Soluble fiber appears to bind cholesterol which is then passed in the stool.  It enough insoluble fiber is eaten, the blood cholesterol can be lowered by 10-15%.  • Examples of insoluble fiber products  o Benefiber: Flavorless    Fiber Facts  Bulking agents (not laxatives).  Although fiber supplements are sold in the laxative section of the pharmacy, they are stool bulking agents, not laxatives.  Because they help retain water in the stool, they work like a sponge in the bowel.  For the person " with dry, hard, small bowel movements, the extra water will keep the stool softer and larger and make it easier to pass.  For the person with loose stool or seepage of fecal material between bowel movements, the extra fluid will be sopped up, resulting in a firmer bowel movement and less seepage.  Better stool consistency.  The consistency of the bulky, fiber-rich stool is not pasty like some laxatives cause and is often easier to clean up since it sticks to itself rather than you.  Benefits beyond a good BM.  There is good data in the medical literature that high fiber diets can:  • Reduce hemorrhoid problems  • Protect against development of diverticulitis and prevent progression of diverticulosis    Introducing Fiber  Increasing fiber suddenly can cause a feeling of bloating or excess gas.  • Start with a teaspoon a day for the first week, two teaspoons per day (same glass) in second week, and heaping tablespoon in third week.  • Drink at least 64 ounces of water daily. Eight, 8 ounces glasses is ideal.      Dietary Sources of Fiber  Don’t want to take a fiber supplement?  • Good sources of fiber in the diet include  o Whole grain foods (such as bran cereals) and breads (those made with whole -wheat grains).  o Fresh fruits (including the skin and the pulp).  o Dried or stewed fruits (such as prunes, raisins, or apricots).  o Root vegetables (such as carrots, turnips or potatoes)  o Raw or fresh vegetables (such as cabbage - lettuce is actually low in fiber)  • Foods that are rich in fiber are often low in fat - so there are two benefits to eating them.  • Most packaged foods list the fiber content in grams per serving (and the serving size will also be listed).  • Meats and dairy products are not good sources of fiber.       Serving Size Grams  Fiber  Serving  Size Grams  Fiber   Cereals   Vegetables     Fiber One  Bran Cereal 1/2 cup 14 Broccoli 1/2 cup 2.2   [employer] Organic High Fiber Hot Cereal 1/3 cup  10 Brussel  Sprouts 1/2 cup 2.3   [employer]ashi  Original Go  Lean 1 cup 10 Carrots 1/2 cup 2.3   All-Bran 1/3 cup 8.5 Corn,  canned 1/2 cup 2.3   Bran Buds 1/3 cup 7.9 Parsnip 1/2 cup 2.7   100% Bran 3/4 cup 8.4 Peas 1/2 cup 3.6   Raisin Bran 3/4 cup 4.0 Potato, with  skin 1 2.5   Shredded 2/3 cup 2.6 Spinach 1/2 cup 2.1           Wheat        Total 1 cup 2.0      Wheat Chex 2/3 cup 2.1      Wheat germ 1/4 cup 3.4 Fruits        Apple, with  skin 1 3.5   Seeds   Apple,  without skin 1 2.7   David Seeds 1 oz 10 g Banana 1 2.4   Flax seeds, whole 1  Tablespoon 10.3 Blueberries 1/2 cup 2.0      Orange 1 2.6   Nuts   Peach, with  skin 1 1.9   Almonds 10 nuts 1.1 Pear, with  skin 1/2 large 3.1   Peanuts 10 nuts 1.4 Prunes 3 3.0      Raisins 1/4 cup 3.1   Breads   Raspberries 1/2 cup 3.1   Bran Muffin 1 2.5 Strawberries 1 cup 3.0   Crisp Rye  Bread 2 2.0      Whole  Wheat  Bread 1 slice 1.4 Legumes  (Beans)        Baked  Beans 1/2 cup 8.9   Pasta &  Rice   Dried Beans 1/2 cup 4.7   Brown  Rice 1/2 cup 1.0 Kidney  Beans 1/2 cup 7.3   Macaroni or  Spaghetti 1 cup 1.0 Lentils 1/2 cup 3.7   Spaghetti, Whole Wheat 1 cup 3.9 Alas Beans 1/2 cup 4.5      Navy Beans 1/2 cup 6.0     Other Resources/Fiber Calculators  (.   USDA National Nutrient Data Base  (http://www.nal.usda.gov/fnic/foodcomp/search/)  (.   [employer] Nutrition Facts (http://www.GoodAppetito/f ood-brands/popular)

## 2020-12-14 NOTE — PROGRESS NOTES
HISTORY & PHYSICAL EXAM    HPI: Patient to be seen today for his first postoperative visit after full-thickness excision of anal lesion.  Is been doing well.  He has no pain really, the patient notes in fact he had no real pain after surgery.  He is moving his bowels without difficulty, taking the milk of magnesia and the fiber is caused him to have some liquidy movements and some occasional incontinence with gas.    Past Medical History:   Diagnosis Date   • Bowel obstruction (CMS/HCC)    • Hepatitis C     Treated     Past Surgical History:   Procedure Laterality Date   • BUNIONECTOMY     • CHOLECYSTECTOMY     • COLON SURGERY  03/12/2015   • COLON SURGERY  07/13/2015   • COLONOSCOPY     • FOOT SURGERY Right    • LUMBAR LAMINECTOMY      L3-L4   • RESECTION SMALL BOWEL / CLOSURE ILEOSTOMY         Current Outpatient Medications:   •  acetaminophen (TylenoL) 325 mg tablet, Take 2 tablets (650 mg total) by mouth every 6 (six) hours as needed for mild pain or moderate pain., Disp: , Rfl:   •  ascorbic acid, vitamin C, (vitamin C) 1,000 mg tablet, Take 100 mg by mouth daily.  , Disp: , Rfl:   •  b complex vitamins (B COMPLEX-VITAMIN B12) tablet, Take 1 tablet by mouth daily., Disp: , Rfl:   •  cholecalciferol, vitamin D3, (VITAMIN D3) 1,000 unit capsule, Take 1,000 Units by mouth daily., Disp: , Rfl:   •  chromium picolinate 1,000 mcg tablet, Take 1 capsule by mouth daily. Stopped for surgery , Disp: , Rfl:   •  cyanocobalamin, vitamin B-12, (VITAMIN B-12 ORAL), Take 100 mcg by mouth daily., Disp: , Rfl:   •  fluticasone propionate (FLONASE) 50 mcg/actuation nasal spray, Administer 1 spray into each nostril daily., Disp: 1 Bottle, Rfl: 5  •  gabapentin (NEURONTIN) 600 mg tablet, Take 1 tablet (600 mg total) by mouth 3 (three) times a day., Disp: 90 tablet, Rfl: 3  •  garlic 1,000 mg capsule, Take by mouth daily. 6 capsules daily ( Stopped for surgery) , Disp: , Rfl:   •  glucos sul 2KCl/msm/chond/C/Mn (GLUCOSAMINE  CHONDROITIN ORAL), Take 1 tablet by mouth daily. Stopped for surgery, Disp: , Rfl:   •  lactobacillus 3/FOS/pantethine (PROBIOTIC AND ACIDOPHILUS ORAL), Take 1 capsule by mouth daily.  , Disp: , Rfl:   •  magnesium 30 mg tablet, Take 30 mg by mouth 2 (two) times a day., Disp: , Rfl:   •  magnesium hydroxide (magnesium hydroxide) 400 mg/5 mL suspension, Take 30 mL by mouth daily for 15 days., Disp: 450 mL, Rfl: 0  •  multivitamin (THERAGRAN) tablet, Take 1 tablet by mouth daily.  , Disp: , Rfl:   •  niacin 500 mg tablet, Take 500 mg by mouth daily with breakfast., Disp: , Rfl:   •  omega 3-dha-epa-fish oil (FISH OIL) 138-183-1,000 mg capsule, Take 1 capsule by mouth daily. Stopped for surgery , Disp: , Rfl:   •  omeprazole (PriLOSEC) 20 mg capsule, Take 20 mg by mouth daily before breakfast., Disp: , Rfl:   •  thiamine (vitamin B-1) 100 mg tablet, Take 100 mg by mouth daily., Disp: , Rfl:   •  vitamin E acetate (VITAMIN E ORAL), Take 400 Units by mouth 4 (four) times a day.  , Disp: , Rfl:   •  sildenafiL (VIAGRA) 100 mg tablet, Take 1 tablet (100 mg total) by mouth daily as needed for erectile dysfunction., Disp: 10 tablet, Rfl: 5   Allergies   Allergen Reactions   • No Known Allergies      Social History     Socioeconomic History   • Marital status:      Spouse name: Not on file   • Number of children: 2   • Years of education: Not on file   • Highest education level: Not on file   Occupational History   • Occupation: retired   Social Needs   • Financial resource strain: Not on file   • Food insecurity     Worry: Not on file     Inability: Not on file   • Transportation needs     Medical: Not on file     Non-medical: Not on file   Tobacco Use   • Smoking status: Former Smoker   • Smokeless tobacco: Never Used   • Tobacco comment: Quit  25 yrs ago   Substance and Sexual Activity   • Alcohol use: No   • Drug use: No   • Sexual activity: Defer   Lifestyle   • Physical activity     Days per week: Not on file      Minutes per session: Not on file   • Stress: Not on file   Relationships   • Social connections     Talks on phone: Not on file     Gets together: Not on file     Attends Baptism service: Not on file     Active member of club or organization: Not on file     Attends meetings of clubs or organizations: Not on file     Relationship status: Not on file   • Intimate partner violence     Fear of current or ex partner: Not on file     Emotionally abused: Not on file     Physically abused: Not on file     Forced sexual activity: Not on file   Other Topics Concern   • Not on file   Social History Narrative    Lives with spouse in a 2 story homr      Family History   Problem Relation Age of Onset   • Hyperlipidemia Biological Mother    • Angina Biological Mother    • Hypertension Biological Mother    • Stroke Biological Father    • Diabetes Biological Father        REVIEW OF SYSTEMS: Unchanged    PHYSICAL EXAM:  GEN: On examination the patient is resting comfortably.  NEURO/PSYCH: Alert and oriented ×3  HEENT: Eyes: Sclera anicteric  CHEST: Equal rise and fall the chest.  No tenderness bilaterally.  SKIN: Warm and moist  NODES: No groin or cervical lymphadenopathy  EXT: No palpable edema of the bilateral lower extremities.  No clubbing.  GI: Abdomen soft, nontender, nondistended.  No palpable liver or spleen edge.  No ventral hernias, mass, or tenderness.  RECTAL: Perianal skin demonstrates a nicely healing lesion in the right lateral position.  Digital is reveals normal sphincter tone no masses palpable.      ASSESSMENT/PLAN:     Anal intraepithelial neoplasia III (AIN III)  Patient had AIN 1, 2, and 3 on the resected anal lesion.  There is no evidence of invasive cancer.  His wound is healed nicely.  Going to see him back in the office in 6 months time for continued surveillance to reassure that there is no recurrence.  After that he should be seen on an annual basis with a Pap smear.  If the Pap smear turns positive any  point or if he has any visible lesion, high resolution anoscopy could be considered.

## 2020-12-14 NOTE — PROGRESS NOTES
1      2             3              4             5     LAST APPOINTMENT:  11/25/2020      TIME ARRIVED:     TIME ROOMED:    VISIT TYPE:   NP       POV     EPV     DISC:             YES         NO                                   PREPPED:  YES  NO     LABS:  [facility]  [facility]  [facility]  OTHER      PHARMACY ENTERED:  YES   NO     DIAGNOSIS: Perianal lesion    SURGERY                                                                                           DATE OF SURGERY        PATHOLOGY STAGE:   RADIATION :    Yes   No                                           DOSAGE:     LOCATION:   Anterior   Right Lateral   Left Lateral   Posterior   Circumferential     LEVEL:                        SIZE:    CT SCAN:   FFC:    FFS:    CEA:    PET:    MRI:      OTHER:       FIBER:  NO  METAMUCIL  KONSYL  CITYRUCEL   FIBERCON  BENEFIBER OTHER  LOMOTIL:    NO    1  VS   2    QD    BID    TID    QID  ZANTAC:       Y / N  AMPHOGEL:    Y / N                                                                          START / STOP                                                 RAD ONC:                              N / A  MED ONC:                              N / A

## 2020-12-14 NOTE — ASSESSMENT & PLAN NOTE
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  There is no evidence of invasive cancer.  His wound is healed nicely.  Going to see him back in the office in 6 months time for continued surveillance to reassure that there is no recurrence.  After that he should be seen on an annual basis with a Pap smear.  If the Pap smear turns positive any point or if he has any visible lesion, high resolution anoscopy could be considered.

## 2020-12-21 ENCOUNTER — TELEMEDICINE (OUTPATIENT)
Dept: NEUROLOGY | Facility: CLINIC | Age: 70
End: 2020-12-21
Payer: COMMERCIAL

## 2020-12-21 DIAGNOSIS — R52 PAIN: Primary | ICD-10-CM

## 2020-12-21 PROCEDURE — 99213 OFFICE O/P EST LOW 20 MIN: CPT | Mod: 95 | Performed by: PSYCHIATRY & NEUROLOGY

## 2020-12-21 NOTE — PROGRESS NOTES
Verification of Patient Location:  The patient affirms they are currently located in the following state: [place]    Request for Consent:    Video Encounter   Henrik, my name is Ceasar French MD.  Before we proceed, can you please verify your identification by telling me your full name and date of birth?  Can you tell me who is in the room with you?    You and I are about to have a telemedicine check-in or visit because you have requested it.  This is a live video-conference.  I am a real person, speaking to you in real time.  There is no one else with me on the video-conference.  However, when we use ([employer], [employer], etc) it is important for you to know that the video-conference may not be secure or private.  I am not recording this conversation and I am asking you not to record it.  This telemedicine visit will be billed to your health insurance or you, if you are self-insured.  You understand you will be responsible for any copayments or coinsurances that apply to your telemedicine visit.  Communication platform used for this encounter:  Non-integrated Zoom     Before starting our telemedicine visit, I am required to get your consent for this virtual check-in or visit by telemedicine. Do you consent?      Patient Response to Request for Consent:  Yes      Visit Documentation:  Subjective    I previously saw the patient 8 months ago for pain.  I recommended continued gabapentin.    Patient ID: Dannie Hardin is a 70 y.o. male.  1950      HPI   Since his last visit, the patient reported that he is doing exceptionally well.  In fact he reported that on October 20 he had right bunion surgery.  The surgery went great and he has made an amazing recovery.  The foot is slightly swollen however he really experiences no pain whatsoever.  He questions whether the gabapentin 600 mg 3 times daily is helping or is unnecessary.    The patient denied any neck or arm symptoms aside from chronic right shoulder  pain.  He has had no pain, numbness or weakness.  In addition, his back and legs are unaffected.  He reported that he has been eating and sleeping well and his mood is good.  He has had no new episodes of transient or static neurologic dysfunction.  Detailed neurologic and medical review of systems is unrevealing.  There were no symptoms to suggest increased intracranial pressure, meningitis or systemic illness.    At his last visit, the patient had lower back pain.  An MRI of the lumbosacral spine showed a right sided disc herniation at L3/L4.  The patient's symptoms have resolved.    The following have been reviewed and updated as appropriate in this visit:  Allergies  Meds  Problems       Review of Systems  A comprehensive review of systems was unremarkable    Assessment/Plan   Diagnoses and all orders for this visit:    Pain (Primary)  Assessment & Plan:  The patient has a history of neuropathic pain.  In the past it was thought that he had a small fiber polyneuropathy however currently he is completely asymptomatic.  He is on a moderate dose of gabapentin and is tolerating the medication well without any side effects.  At this time he has no pain, numbness or weakness.    I spoke at length with the patient regarding his condition.  At this time we decided to gradually taper the gabapentin off as tolerated.  If pain returns, he will reinitiate treatment at his prior dose.  Otherwise we will try to do everything we can to get him off all medications.  He is extremely active and does not need reminding with regards to exercise.  In the future, additional diagnostics and treatments will depend on his clinical course.        Time Spent in Medical Discussion During This Encounter:     17 minutes

## 2020-12-21 NOTE — ASSESSMENT & PLAN NOTE
The patient has a history of neuropathic pain.  In the past it was thought that he had a small fiber polyneuropathy however currently he is completely asymptomatic.  He is on a moderate dose of gabapentin and is tolerating the medication well without any side effects.  At this time he has no pain, numbness or weakness.    I spoke at length with the patient regarding his condition.  At this time we decided to gradually taper the gabapentin off as tolerated.  If pain returns, he will reinitiate treatment at his prior dose.  Otherwise we will try to do everything we can to get him off all medications.  He is extremely active and does not need reminding with regards to exercise.  In the future, additional diagnostics and treatments will depend on his clinical course.

## 2021-01-26 DIAGNOSIS — R52 PAIN: ICD-10-CM

## 2021-01-27 RX ORDER — GABAPENTIN 600 MG/1
TABLET ORAL
Qty: 90 TABLET | Refills: 2 | Status: SHIPPED | OUTPATIENT
Start: 2021-01-27 | End: 2021-05-03

## 2021-01-27 RX ORDER — GABAPENTIN 600 MG/1
600 TABLET ORAL 3 TIMES DAILY
Qty: 90 TABLET | Refills: 3 | OUTPATIENT
Start: 2021-01-27 | End: 2021-05-27

## 2021-01-27 RX ORDER — FLUTICASONE PROPIONATE 50 MCG
1 SPRAY, SUSPENSION (ML) NASAL DAILY
Qty: 1 G | Refills: 3 | Status: SHIPPED | OUTPATIENT
Start: 2021-01-27

## 2021-01-27 NOTE — TELEPHONE ENCOUNTER
Medicine Refill Request    Last Office Visit: 12/17/2019  Last Telemedicine Visit: 12/21/2020 Ceasar French MD    Next Office Visit: Visit date not found  Next Telemedicine Visit: Visit date not found     The patient has a history of neuropathic pain.  In the past it was thought that he had a small fiber polyneuropathy however currently he is completely asymptomatic.  He is on a moderate dose of gabapentin and is tolerating the medication well without any side effects.  At this time he has no pain, numbness or weakness.     I spoke at length with the patient regarding his condition.  At this time we decided to gradually taper the gabapentin off as tolerated.

## 2021-01-27 NOTE — TELEPHONE ENCOUNTER
Medicine Refill Request    Last Office Visit: 10/5/2020  Last Telemedicine Visit: Visit date not found    Next Office Visit: Visit date not found  Next Telemedicine Visit: Visit date not found         Current Outpatient Medications:   •  ascorbic acid, vitamin C, (vitamin C) 1,000 mg tablet, Take 100 mg by mouth daily.  , Disp: , Rfl:   •  b complex vitamins (B COMPLEX-VITAMIN B12) tablet, Take 1 tablet by mouth daily., Disp: , Rfl:   •  cholecalciferol, vitamin D3, (VITAMIN D3) 1,000 unit capsule, Take 1,000 Units by mouth daily., Disp: , Rfl:   •  chromium picolinate 1,000 mcg tablet, Take 1 capsule by mouth daily. Stopped for surgery , Disp: , Rfl:   •  cyanocobalamin, vitamin B-12, (VITAMIN B-12 ORAL), Take 100 mcg by mouth daily., Disp: , Rfl:   •  fluticasone propionate (FLONASE) 50 mcg/actuation nasal spray, Administer 1 spray into each nostril daily., Disp: 1 Bottle, Rfl: 5  •  gabapentin (NEURONTIN) 600 mg tablet, Take 1 tablet (600 mg total) by mouth 3 (three) times a day., Disp: 90 tablet, Rfl: 3  •  garlic 1,000 mg capsule, Take by mouth daily. 6 capsules daily ( Stopped for surgery) , Disp: , Rfl:   •  glucos sul 2KCl/msm/chond/C/Mn (GLUCOSAMINE CHONDROITIN ORAL), Take 1 tablet by mouth daily. Stopped for surgery, Disp: , Rfl:   •  lactobacillus 3/FOS/pantethine (PROBIOTIC AND ACIDOPHILUS ORAL), Take 1 capsule by mouth daily.  , Disp: , Rfl:   •  magnesium 30 mg tablet, Take 30 mg by mouth 2 (two) times a day., Disp: , Rfl:   •  magnesium hydroxide (magnesium hydroxide) 400 mg/5 mL suspension, Take 30 mL by mouth daily for 15 days., Disp: 450 mL, Rfl: 0  •  multivitamin (THERAGRAN) tablet, Take 1 tablet by mouth daily.  , Disp: , Rfl:   •  niacin 500 mg tablet, Take 500 mg by mouth daily with breakfast., Disp: , Rfl:   •  omega 3-dha-epa-fish oil (FISH OIL) 138-183-1,000 mg capsule, Take 1 capsule by mouth daily. Stopped for surgery , Disp: , Rfl:   •  omeprazole (PriLOSEC) 20 mg capsule, Take 20 mg by  mouth daily before breakfast., Disp: , Rfl:   •  sildenafiL (VIAGRA) 100 mg tablet, Take 1 tablet (100 mg total) by mouth daily as needed for erectile dysfunction., Disp: 10 tablet, Rfl: 5  •  thiamine (vitamin B-1) 100 mg tablet, Take 100 mg by mouth daily., Disp: , Rfl:   •  vitamin E acetate (VITAMIN E ORAL), Take 400 Units by mouth 4 (four) times a day.  , Disp: , Rfl:       BP Readings from Last 3 Encounters:   12/01/20 (!) 167/68   11/27/20 139/77   11/25/20 130/90       Recent Lab results:  Lab Results   Component Value Date    CHOL 176 08/01/2019   ,   Lab Results   Component Value Date    HDL 41 08/01/2019   ,   Lab Results   Component Value Date    LDLCALC 104 (H) 08/01/2019   ,   Lab Results   Component Value Date    TRIG 154 (H) 08/01/2019        Lab Results   Component Value Date    GLUCOSE 87 11/27/2020   ,   Lab Results   Component Value Date    HGBA1C 5.4 08/01/2019         Lab Results   Component Value Date    CREATININE 0.9 11/27/2020       Lab Results   Component Value Date    TSH 2.35 04/01/2019

## 2021-05-02 DIAGNOSIS — R52 PAIN: ICD-10-CM

## 2021-05-03 RX ORDER — GABAPENTIN 600 MG/1
TABLET ORAL
Qty: 90 TABLET | Refills: 2 | Status: SHIPPED | OUTPATIENT
Start: 2021-05-03 | End: 2021-09-09 | Stop reason: SDUPTHER

## 2021-05-03 NOTE — TELEPHONE ENCOUNTER
Medicine Refill Request    Last Office Visit: 12/17/2019  Last Telemedicine Visit: 12/21/2020 Ceasar French MD    Next Office Visit: Visit date not found  Next Telemedicine Visit: Visit date not found

## 2021-05-21 ENCOUNTER — DOCUMENTATION (OUTPATIENT)
Dept: COLORECTAL SURGERY | Facility: CLINIC | Age: 71
End: 2021-05-21

## 2021-05-21 NOTE — PROGRESS NOTES
1       2             3              4             5     LAST APPOINTMENT: 12/14/2020     TIME ARRIVED:     TIME ROOMED:     VISIT TYPE:   NP       POV     EPV     DISC:             YES         NO                                   PREPPED:  YES  NO     LABS:  [facility]  [facility]  [facility]  OTHER      PHARMACY ENTERED:  YES   NO     DIAGNOSIS:  Perianal Lesion; Anal intraepithelial neoplasia III (AIN III)  SURGERY                                                                                           DATE OF SURGERY   12/01/2020 - rectal examination under anesthesia with full-thickness excision of anal margin lesion marked short Vicryl stitch superior long Vicryl stitch lateral and Prolene stitch at anal canal margin     PATHOLOGY STAGE:   RADIATION :    Yes   No                                           DOSAGE:      LOCATION:   Anterior   Right Lateral   Left Lateral   Posterior   Circumferential      LEVEL:                        SIZE:     CT SCAN:   FFC:    FFS:    CEA:    PET:    MRI:      OTHER:       FIBER:  NO  METAMUCIL  KONSYL  CITYRUCEL   FIBERCON  BENEFIBER OTHER  LOMOTIL:    NO    1  VS   2    QD    BID    TID    QID  ZANTAC:       Y / N  AMPHOGEL:    Y / N                                                                          START / STOP                                                 RAD ONC:                              N / A        MED ONC:                             N / A

## 2021-09-09 DIAGNOSIS — R52 PAIN: ICD-10-CM

## 2021-09-09 RX ORDER — GABAPENTIN 600 MG/1
600 TABLET ORAL
Qty: 90 TABLET | Refills: 2 | Status: SHIPPED | OUTPATIENT
Start: 2021-09-09 | End: 2021-12-09

## 2021-09-09 RX ORDER — GABAPENTIN 600 MG/1
TABLET ORAL
Qty: 90 TABLET | Refills: 2 | OUTPATIENT
Start: 2021-09-09

## 2021-10-28 ENCOUNTER — OFFICE VISIT (OUTPATIENT)
Dept: PRIMARY CARE | Facility: CLINIC | Age: 71
End: 2021-10-28
Payer: COMMERCIAL

## 2021-10-28 VITALS
SYSTOLIC BLOOD PRESSURE: 120 MMHG | DIASTOLIC BLOOD PRESSURE: 70 MMHG | HEIGHT: 72 IN | TEMPERATURE: 97.7 F | OXYGEN SATURATION: 97 % | WEIGHT: 203 LBS | HEART RATE: 71 BPM | BODY MASS INDEX: 27.5 KG/M2 | RESPIRATION RATE: 17 BRPM

## 2021-10-28 DIAGNOSIS — Z23 NEED FOR VACCINATION: Primary | ICD-10-CM

## 2021-10-28 DIAGNOSIS — Z12.5 SCREENING FOR PROSTATE CANCER: ICD-10-CM

## 2021-10-28 DIAGNOSIS — Z00.00 ENCOUNTER FOR GENERAL ADULT MEDICAL EXAMINATION WITHOUT ABNORMAL FINDINGS: ICD-10-CM

## 2021-10-28 DIAGNOSIS — E53.8 VITAMIN B12 DEFICIENCY: ICD-10-CM

## 2021-10-28 DIAGNOSIS — E78.00 HYPERCHOLESTEREMIA: ICD-10-CM

## 2021-10-28 DIAGNOSIS — B18.2 CHRONIC HEPATITIS C WITHOUT HEPATIC COMA (CMS/HCC): ICD-10-CM

## 2021-10-28 PROCEDURE — 3008F BODY MASS INDEX DOCD: CPT | Performed by: INTERNAL MEDICINE

## 2021-10-28 PROCEDURE — 90471 IMMUNIZATION ADMIN: CPT | Performed by: INTERNAL MEDICINE

## 2021-10-28 PROCEDURE — 99397 PER PM REEVAL EST PAT 65+ YR: CPT | Mod: 25 | Performed by: INTERNAL MEDICINE

## 2021-10-28 PROCEDURE — 90694 VACC AIIV4 NO PRSRV 0.5ML IM: CPT | Performed by: INTERNAL MEDICINE

## 2021-10-28 ASSESSMENT — ENCOUNTER SYMPTOMS
LIGHT-HEADEDNESS: 0
ABDOMINAL PAIN: 0
WOUND: 0
WHEEZING: 0
ABDOMINAL DISTENTION: 0
ENDOCRINE NEGATIVE: 1
DIARRHEA: 0
SEIZURES: 0
HEADACHES: 0
HEMATURIA: 0
CONSTITUTIONAL NEGATIVE: 1
NECK PAIN: 0
NERVOUS/ANXIOUS: 0
VOMITING: 0
SINUS PRESSURE: 0
NAUSEA: 0
COUGH: 0
CONFUSION: 0
CARDIOVASCULAR NEGATIVE: 1
RHINORRHEA: 0
SHORTNESS OF BREATH: 0
ARTHRALGIAS: 1
HEMATOLOGIC/LYMPHATIC NEGATIVE: 1
NUMBNESS: 0
TREMORS: 0
SORE THROAT: 0
BACK PAIN: 0
DYSURIA: 0
FREQUENCY: 0
TROUBLE SWALLOWING: 0
DIZZINESS: 0
BLOOD IN STOOL: 0
WEAKNESS: 0
SLEEP DISTURBANCE: 0

## 2021-10-28 NOTE — ASSESSMENT & PLAN NOTE
The patient is compliant with prescribed medications and I await the lab results to see that goals are being met

## 2021-10-28 NOTE — PROGRESS NOTES
Subjective      Patient ID: Dannie Hardin is a 71 y.o. male.    HPI    The following have been reviewed and updated as appropriate in this visit:  Allergies  Meds  Problems       Review of Systems   Constitutional: Negative.    HENT: Positive for tinnitus. Negative for congestion, hearing loss, nosebleeds, postnasal drip, rhinorrhea, sinus pressure, sore throat and trouble swallowing.    Eyes: Negative for visual disturbance.   Respiratory: Negative for cough, shortness of breath and wheezing.    Cardiovascular: Negative.    Gastrointestinal: Negative for abdominal distention, abdominal pain, blood in stool, diarrhea, nausea and vomiting.   Endocrine: Negative.    Genitourinary: Negative for dysuria, frequency, hematuria and urgency.   Musculoskeletal: Positive for arthralgias (R shoulder pain). Negative for back pain and neck pain.   Skin: Negative for rash and wound.   Allergic/Immunologic: Negative for environmental allergies.   Neurological: Negative for dizziness, tremors, seizures, syncope, weakness, light-headedness, numbness and headaches.   Hematological: Negative.    Psychiatric/Behavioral: Negative for confusion and sleep disturbance. The patient is not nervous/anxious.    viable    Objective     Physical Exam  Vitals reviewed.   HENT:      Head: Normocephalic and atraumatic.   Eyes:      Conjunctiva/sclera: Conjunctivae normal.      Pupils: Pupils are equal, round, and reactive to light.   Cardiovascular:      Rate and Rhythm: Normal rate and regular rhythm.   Pulmonary:      Breath sounds: Normal breath sounds.   Abdominal:      General: Abdomen is flat. There is no distension.      Tenderness: There is no abdominal tenderness.   Musculoskeletal:         General: Normal range of motion.      Cervical back: Normal range of motion.      Right lower leg: No edema.      Left lower leg: No edema.   Neurological:      General: No focal deficit present.      Mental Status: He is alert.      Cranial Nerves:  No cranial nerve deficit.   Psychiatric:         Mood and Affect: Mood normal.         Assessment/Plan   Problem List Items Addressed This Visit        Digestive    Vitamin B12 deficiency    Relevant Orders    Vitamin B12    Viral hepatitis C       Endocrine/Metabolic    Hypercholesteremia     The patient is compliant with prescribed medications and I await the lab results to see that goals are being met         Relevant Orders    Lipid panel       Other    Encounter for general adult medical examination without abnormal findings     Screening blood work ordered I await the results.  Of note colonoscopy performed in November of 2015 demonstrated a partial obstruction but no other abnormalities and he will not be due for repeat colonoscopy till after 7 to 10-year interval         Relevant Orders    CBC and Differential    Comprehensive metabolic panel    Hemoglobin A1c    Screening for prostate cancer    Relevant Orders    PSA      Other Visit Diagnoses     Need for vaccination    -  Primary    Relevant Orders    Influenza vaccine Quad Adjuvanted 65 and Older (FluAd Quad)

## 2021-10-28 NOTE — ASSESSMENT & PLAN NOTE
Screening blood work ordered I await the results.  Of note colonoscopy performed in November of 2015 demonstrated a partial obstruction but no other abnormalities and he will not be due for repeat colonoscopy till after 7 to 10-year interval

## 2021-11-11 LAB
BASOPHILS # BLD AUTO: 0.1 X10E3/UL (ref 0–0.2)
BASOPHILS NFR BLD AUTO: 1 %
EOSINOPHIL # BLD AUTO: 0.2 X10E3/UL (ref 0–0.4)
EOSINOPHIL NFR BLD AUTO: 2 %
ERYTHROCYTE [DISTWIDTH] IN BLOOD BY AUTOMATED COUNT: 13.2 % (ref 11.6–15.4)
HCT VFR BLD AUTO: 45.2 % (ref 37.5–51)
HGB BLD-MCNC: 15 G/DL (ref 13–17.7)
IMM GRANULOCYTES # BLD AUTO: 0 X10E3/UL (ref 0–0.1)
IMM GRANULOCYTES NFR BLD AUTO: 1 %
LYMPHOCYTES # BLD AUTO: 1.5 X10E3/UL (ref 0.7–3.1)
LYMPHOCYTES NFR BLD AUTO: 23 %
MCH RBC QN AUTO: 28.1 PG (ref 26.6–33)
MCHC RBC AUTO-ENTMCNC: 33.2 G/DL (ref 31.5–35.7)
MCV RBC AUTO: 85 FL (ref 79–97)
MONOCYTES # BLD AUTO: 0.5 X10E3/UL (ref 0.1–0.9)
MONOCYTES NFR BLD AUTO: 7 %
NEUTROPHILS # BLD AUTO: 4.3 X10E3/UL (ref 1.4–7)
NEUTROPHILS NFR BLD AUTO: 66 %
PLATELET # BLD AUTO: 217 X10E3/UL (ref 150–450)
RBC # BLD AUTO: 5.34 X10E6/UL (ref 4.14–5.8)
SPECIMEN STATUS: NORMAL
WBC # BLD AUTO: 6.5 X10E3/UL (ref 3.4–10.8)

## 2021-11-12 LAB
ALBUMIN SERPL-MCNC: 4.4 G/DL (ref 3.7–4.7)
ALBUMIN/GLOB SERPL: 1.7 {RATIO} (ref 1.2–2.2)
ALP SERPL-CCNC: 73 IU/L (ref 44–121)
ALT SERPL-CCNC: 28 IU/L (ref 0–44)
AST SERPL-CCNC: 30 IU/L (ref 0–40)
BILIRUB SERPL-MCNC: 1.5 MG/DL (ref 0–1.2)
BUN SERPL-MCNC: 20 MG/DL (ref 8–27)
BUN/CREAT SERPL: 19 (ref 10–24)
CALCIUM SERPL-MCNC: 9.7 MG/DL (ref 8.6–10.2)
CHLORIDE SERPL-SCNC: 102 MMOL/L (ref 96–106)
CHOLEST SERPL-MCNC: 203 MG/DL (ref 100–199)
CO2 SERPL-SCNC: 24 MMOL/L (ref 20–29)
CREAT SERPL-MCNC: 1.05 MG/DL (ref 0.76–1.27)
GLOBULIN SER CALC-MCNC: 2.6 G/DL (ref 1.5–4.5)
GLUCOSE SERPL-MCNC: 106 MG/DL (ref 65–99)
HBA1C MFR BLD: 5.5 % (ref 4.8–5.6)
HDLC SERPL-MCNC: 45 MG/DL
LAB CORP EGFR IF AFRICN AM: 82 ML/MIN/1.73
LAB CORP EGFR IF NONAFRICN AM: 71 ML/MIN/1.73
LDLC SERPL CALC-MCNC: 123 MG/DL (ref 0–99)
POTASSIUM SERPL-SCNC: 4.9 MMOL/L (ref 3.5–5.2)
PROT SERPL-MCNC: 7 G/DL (ref 6–8.5)
PSA SERPL-MCNC: 0.1 NG/ML (ref 0–4)
SODIUM SERPL-SCNC: 141 MMOL/L (ref 134–144)
TRIGL SERPL-MCNC: 197 MG/DL (ref 0–149)
VIT B12 SERPL-MCNC: 651 PG/ML (ref 232–1245)
VLDLC SERPL CALC-MCNC: 35 MG/DL (ref 5–40)

## 2021-11-17 ENCOUNTER — TELEPHONE (OUTPATIENT)
Dept: PRIMARY CARE | Facility: CLINIC | Age: 71
End: 2021-11-17

## 2021-11-17 NOTE — TELEPHONE ENCOUNTER
----- Message from Edenilson Haile MD sent at 11/15/2021  4:27 PM EST -----  Labs reviewed on the 71-year-old gentleman who is in for routine visit earlier this month1.  Lipid profile slightly worsened with a total cholesterol of 203 and an LDL of 123 (176, 104 when checked 2 years ago).  No need for pharmacologic intervention just 1 to make him aware this.  HDL is okay but not great at 452.  All else is fine

## 2021-12-09 DIAGNOSIS — R52 PAIN: ICD-10-CM

## 2021-12-09 RX ORDER — GABAPENTIN 600 MG/1
TABLET ORAL
Qty: 90 TABLET | Refills: 2 | Status: SHIPPED | OUTPATIENT
Start: 2021-12-09 | End: 2022-03-14

## 2022-03-13 DIAGNOSIS — R52 PAIN: ICD-10-CM

## 2022-03-14 RX ORDER — GABAPENTIN 600 MG/1
600 TABLET ORAL 3 TIMES DAILY
Qty: 90 TABLET | Refills: 2 | Status: SHIPPED | OUTPATIENT
Start: 2022-03-14 | End: 2022-06-06

## 2022-06-01 ENCOUNTER — PATIENT OUTREACH (OUTPATIENT)
Dept: CASE MANAGEMENT | Facility: CLINIC | Age: 72
End: 2022-06-01
Payer: COMMERCIAL

## 2022-06-01 NOTE — PROGRESS NOTES
OBED #1  Ambulatory Care Coordinator phoned patient for OBED initial call. Voicemail left informed of Care Coordinator role, provided contact information, requested call back.  Will  continue to follow for OBED.   Cathryn Osman RN BSN   Ambulatory Care Coordinator  [phone]

## 2022-06-02 NOTE — PROGRESS NOTES
OBED #2 Call to patient, left VM requesting call back.    Cathryn Osman RN BSN  Ambulatory Care Coordinator  [phone]

## 2022-06-03 NOTE — PROGRESS NOTES
NAME: Dannie Hardin    MRN: [id number]    YOB: 1950    Event Review:    Initial TCM Patient Outreach Date: 06/01/22  Discharge Diagnosis: STEMI    Patient readmitted in the last 30 days: No  Discharging Facility: [facility]  Date of Admission: 05/30/22  Date of Discharge: 05/31/22    Two outreach calls were made within 1-2 days of discharge; unable to reach patient.  .  RN phone number given for future care management.    Cathryn Osman RN

## 2022-06-06 DIAGNOSIS — R52 PAIN: ICD-10-CM

## 2022-06-06 RX ORDER — GABAPENTIN 600 MG/1
600 TABLET ORAL 3 TIMES DAILY
Qty: 90 TABLET | Refills: 2 | Status: SHIPPED | OUTPATIENT
Start: 2022-06-06 | End: 2022-08-29 | Stop reason: SDUPTHER

## 2022-07-12 ENCOUNTER — OFFICE VISIT (OUTPATIENT)
Dept: FAMILY MEDICINE | Facility: CLINIC | Age: 72
End: 2022-07-12
Payer: COMMERCIAL

## 2022-07-12 VITALS
TEMPERATURE: 97.6 F | RESPIRATION RATE: 16 BRPM | BODY MASS INDEX: 27.55 KG/M2 | DIASTOLIC BLOOD PRESSURE: 82 MMHG | HEART RATE: 96 BPM | WEIGHT: 196.8 LBS | HEIGHT: 71 IN | SYSTOLIC BLOOD PRESSURE: 112 MMHG | OXYGEN SATURATION: 95 %

## 2022-07-12 DIAGNOSIS — I25.810 CORONARY ARTERY DISEASE INVOLVING CORONARY BYPASS GRAFT OF NATIVE HEART WITHOUT ANGINA PECTORIS: ICD-10-CM

## 2022-07-12 DIAGNOSIS — N52.9 ERECTILE DYSFUNCTION, UNSPECIFIED ERECTILE DYSFUNCTION TYPE: Primary | ICD-10-CM

## 2022-07-12 DIAGNOSIS — B18.2 CHRONIC HEPATITIS C WITHOUT HEPATIC COMA (CMS/HCC): ICD-10-CM

## 2022-07-12 DIAGNOSIS — E78.00 HYPERCHOLESTEREMIA: ICD-10-CM

## 2022-07-12 PROBLEM — Z00.00 PREVENTATIVE HEALTH CARE: Status: RESOLVED | Noted: 2018-08-14 | Resolved: 2022-07-12

## 2022-07-12 PROBLEM — R53.1 WEAKNESS: Status: RESOLVED | Noted: 2020-04-21 | Resolved: 2022-07-12

## 2022-07-12 PROBLEM — R52 PAIN: Status: RESOLVED | Noted: 2019-04-01 | Resolved: 2022-07-12

## 2022-07-12 PROBLEM — D01.3 ANAL INTRAEPITHELIAL NEOPLASIA III (AIN III): Status: RESOLVED | Noted: 2020-12-14 | Resolved: 2022-07-12

## 2022-07-12 PROBLEM — Z12.5 SCREENING FOR PROSTATE CANCER: Status: RESOLVED | Noted: 2020-10-05 | Resolved: 2022-07-12

## 2022-07-12 PROBLEM — Z00.00 ENCOUNTER FOR GENERAL ADULT MEDICAL EXAMINATION WITHOUT ABNORMAL FINDINGS: Status: RESOLVED | Noted: 2018-08-14 | Resolved: 2022-07-12

## 2022-07-12 PROBLEM — I25.10 CORONARY ARTERY DISEASE: Status: ACTIVE | Noted: 2022-07-12

## 2022-07-12 PROBLEM — K62.9 PERIANAL LESION: Status: RESOLVED | Noted: 2020-11-25 | Resolved: 2022-07-12

## 2022-07-12 PROBLEM — M21.611 BUNION OF RIGHT FOOT: Status: RESOLVED | Noted: 2020-10-05 | Resolved: 2022-07-12

## 2022-07-12 PROBLEM — R20.0 NUMBNESS: Status: RESOLVED | Noted: 2019-04-01 | Resolved: 2022-07-12

## 2022-07-12 PROBLEM — I21.9 HEART ATTACK (CMS/HCC): Status: ACTIVE | Noted: 2022-05-30

## 2022-07-12 PROBLEM — F14.11 HISTORY OF COCAINE ABUSE (CMS/HCC): Status: RESOLVED | Noted: 2019-10-21 | Resolved: 2022-07-12

## 2022-07-12 PROCEDURE — 99203 OFFICE O/P NEW LOW 30 MIN: CPT | Performed by: FAMILY MEDICINE

## 2022-07-12 PROCEDURE — 3008F BODY MASS INDEX DOCD: CPT | Performed by: FAMILY MEDICINE

## 2022-07-12 RX ORDER — NITROGLYCERIN 0.4 MG/1
TABLET SUBLINGUAL
COMMUNITY
Start: 2022-05-31 | End: 2023-11-16

## 2022-07-12 RX ORDER — CLOPIDOGREL BISULFATE 75 MG/1
75 TABLET ORAL
COMMUNITY
Start: 2022-06-27 | End: 2023-11-16

## 2022-07-12 RX ORDER — METOPROLOL SUCCINATE 25 MG/1
25 TABLET, EXTENDED RELEASE ORAL
COMMUNITY
Start: 2022-06-27

## 2022-07-12 RX ORDER — ATORVASTATIN CALCIUM 80 MG/1
80 TABLET, FILM COATED ORAL
COMMUNITY
Start: 2022-06-27 | End: 2024-01-23

## 2022-07-12 RX ORDER — RAMIPRIL 2.5 MG/1
CAPSULE ORAL
COMMUNITY
Start: 2022-06-28 | End: 2023-11-16 | Stop reason: ALTCHOICE

## 2022-07-12 ASSESSMENT — ENCOUNTER SYMPTOMS: MYALGIAS: 1

## 2022-07-12 ASSESSMENT — PATIENT HEALTH QUESTIONNAIRE - PHQ9: SUM OF ALL RESPONSES TO PHQ9 QUESTIONS 1 & 2: 0

## 2022-07-12 NOTE — PROGRESS NOTES
[facility]  Dr. Nan Mccain  [address] [place] [address]     Dannie Hardin is a 72 y.o. male who present for   Chief Complaint   Patient presents with   • Establish Care        Patient had a heart attack while exercising.  Reviewed hospital records.  He is feeling well and exercising as usual.  He was taking Viagra 100 mg prior to the heart attack and would like a refill.          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 (CMS/HCC) 05/30/2022       Past Surgical History:   Procedure Laterality Date   • BUNIONECTOMY     • CHOLECYSTECTOMY     • COLON SURGERY  03/12/2015   • COLON SURGERY  07/13/2015   • COLONOSCOPY     • FOOT SURGERY Right    • HEMORROIDECTOMY  2020   • LUMBAR LAMINECTOMY      L3-L4   • RESECTION SMALL BOWEL / CLOSURE ILEOSTOMY         Social History     Occupational History   • Occupation: retired   Tobacco Use   • Smoking status: Former Smoker   • Smokeless tobacco: Never Used   • Tobacco comment: Quit  25 yrs ago   Vaping Use   • Vaping Use: Former   Substance and Sexual Activity   • Alcohol use: No   • Drug use: No   • Sexual activity: Defer        Family History   Problem Relation Age of Onset   • Hyperlipidemia Biological Mother    • Angina Biological Mother    • Hypertension Biological Mother    • Stroke Biological Father    • Diabetes Biological Father        No known allergies      Current Outpatient Medications:   •  ascorbic acid, vitamin C, (VITAMIN C) 1,000 mg tablet, Take 100 mg by mouth daily.  , Disp: , Rfl:   •  aspirin 81 MG oral suspension, , Disp: , Rfl:   •  atorvastatin (LIPITOR) 80 mg tablet, Take 80 mg by mouth once daily., Disp: , Rfl:   •  cholecalciferol, vitamin D3, 25 mcg (1,000 unit) capsule, Take  1,000 Units by mouth daily., Disp: , Rfl:   •  chromium picolinate 1,000 mcg tablet, Take 1 capsule by mouth daily. Stopped for surgery , Disp: , Rfl:   •  clopidogreL (PLAVIX) 75 mg tablet, Take 75 mg by mouth once daily., Disp: , Rfl:   •  cyanocobalamin, vitamin B-12, (VITAMIN B-12 ORAL), Take 100 mcg by mouth daily., Disp: , Rfl:   •  fluticasone propionate (FLONASE) 50 mcg/actuation nasal spray, Administer 1 spray into each nostril daily., Disp: 1 g, Rfl: 3  •  gabapentin (NEURONTIN) 600 mg tablet, Take 1 tablet (600 mg total) by mouth 3 (three) times a day., Disp: 90 tablet, Rfl: 2  •  garlic 1,000 mg capsule, Take by mouth daily. 6 capsules daily ( Stopped for surgery) , Disp: , Rfl:   •  glucos sul 2KCl/msm/chond/C/Mn (GLUCOSAMINE CHONDROITIN ORAL), Take 1 tablet by mouth daily. Stopped for surgery, Disp: , Rfl:   •  lactobacillus 3/FOS/pantethine (PROBIOTIC AND ACIDOPHILUS ORAL), Take 1 capsule by mouth daily.  , Disp: , Rfl:   •  magnesium 30 mg tablet, Take 30 mg by mouth 2 (two) times a day., Disp: , Rfl:   •  metoprolol succinate XL (TOPROL-XL) 25 mg 24 hr tablet, Take 25 mg by mouth once daily., Disp: , Rfl:   •  multivitamin (THERAGRAN) tablet, Take 1 tablet by mouth daily.  , Disp: , Rfl:   •  niacin 500 mg tablet, Take 500 mg by mouth daily with breakfast., Disp: , Rfl:   •  nitroglycerin (NITROSTAT) 0.4 mg SL tablet, TAKE 1 TABLET BY MOUTH SUBLINGUALLY EVERY 5 MINS AS NEEDED FOR CHEST PAIN, Disp: , Rfl:   •  omega 3-dha-epa-fish oil 138-183-1,000 mg capsule, Take 1 capsule by mouth daily. Stopped for surgery , Disp: , Rfl:   •  omeprazole (PriLOSEC) 20 mg capsule, Take 20 mg by mouth daily before breakfast., Disp: , Rfl:   •  ramipriL (ALTACE) 2.5 mg capsule, Take by mouth once daily., Disp: , Rfl:   •  sildenafiL (VIAGRA) 100 mg tablet, Take 1 tablet (100 mg total) by mouth daily as needed for erectile dysfunction., Disp: 10 tablet, Rfl: 5  •  thiamine 100 mg tablet, Take 100 mg by mouth daily.,  "Disp: , Rfl:   •  vitamin E acetate (VITAMIN E ORAL), Take 400 Units by mouth once., Disp: , Rfl:   •  b complex vitamins tablet, Take 1 tablet by mouth daily., Disp: , Rfl:     Review of Systems   Musculoskeletal: Positive for myalgias.       Vitals:    07/12/22 0926   BP: 112/82   Pulse: 96   Resp: 16   Temp: 36.4 °C (97.6 °F)   SpO2: 95%   Weight: 89.3 kg (196 lb 12.8 oz)   Height: 1.791 m (5' 10.5\")     Body mass index is 27.84 kg/m².    Physical Exam  Vitals reviewed.   Constitutional:       General: He is not in acute distress.  HENT:      Right Ear: Tympanic membrane normal.      Left Ear: Tympanic membrane normal.      Mouth/Throat:      Mouth: Mucous membranes are moist.   Eyes:      Conjunctiva/sclera: Conjunctivae normal.   Neck:      Thyroid: No thyromegaly.   Cardiovascular:      Rate and Rhythm: Normal rate and regular rhythm.      Heart sounds: No murmur heard.     Comments: Positive varicose veins bilaterally.  Pulmonary:      Effort: Pulmonary effort is normal.      Breath sounds: Normal breath sounds. No rales.   Abdominal:      General: Bowel sounds are normal.      Palpations: Abdomen is soft. There is no mass.      Tenderness: There is no abdominal tenderness.   Musculoskeletal:      Cervical back: Normal range of motion.      Right lower leg: No edema.      Left lower leg: No edema.   Skin:     Findings: No rash.   Neurological:      General: No focal deficit present.      Mental Status: He is alert and oriented to person, place, and time.      Gait: Gait normal.   Psychiatric:         Mood and Affect: Mood normal.             Assessment   Problem List Items Addressed This Visit        Circulatory    Coronary artery disease    Relevant Medications    aspirin 81 MG oral suspension    atorvastatin (LIPITOR) 80 mg tablet    clopidogreL (PLAVIX) 75 mg tablet    metoprolol succinate XL (TOPROL-XL) 25 mg 24 hr tablet    nitroglycerin (NITROSTAT) 0.4 mg SL tablet    ramipriL (ALTACE) 2.5 mg capsule    "    Digestive    Chronic hepatitis C (CMS/HCC)       Endocrine/Metabolic    Hypercholesteremia    Relevant Medications    atorvastatin (LIPITOR) 80 mg tablet       Other    Erectile dysfunction - Primary     He will get clearance from cardiology to start back on Viagra.                     Nan Mccain MD  7/12/2022

## 2022-08-29 DIAGNOSIS — R52 PAIN: ICD-10-CM

## 2022-08-29 RX ORDER — GABAPENTIN 600 MG/1
600 TABLET ORAL 3 TIMES DAILY
Qty: 90 TABLET | Refills: 2 | Status: SHIPPED | OUTPATIENT
Start: 2022-08-29 | End: 2022-12-09 | Stop reason: SDUPTHER

## 2022-12-06 ENCOUNTER — OFFICE VISIT (OUTPATIENT)
Dept: FAMILY MEDICINE | Facility: CLINIC | Age: 72
End: 2022-12-06
Payer: COMMERCIAL

## 2022-12-06 VITALS
HEART RATE: 68 BPM | BODY MASS INDEX: 27.82 KG/M2 | DIASTOLIC BLOOD PRESSURE: 75 MMHG | RESPIRATION RATE: 14 BRPM | TEMPERATURE: 97.5 F | HEIGHT: 72 IN | OXYGEN SATURATION: 96 % | WEIGHT: 205.4 LBS | SYSTOLIC BLOOD PRESSURE: 115 MMHG

## 2022-12-06 DIAGNOSIS — G62.9 NEUROPATHY: Primary | ICD-10-CM

## 2022-12-06 DIAGNOSIS — G89.29 CHRONIC RIGHT SHOULDER PAIN: ICD-10-CM

## 2022-12-06 DIAGNOSIS — E78.00 HYPERCHOLESTEREMIA: ICD-10-CM

## 2022-12-06 DIAGNOSIS — I25.810 CORONARY ARTERY DISEASE INVOLVING CORONARY BYPASS GRAFT OF NATIVE HEART WITHOUT ANGINA PECTORIS: ICD-10-CM

## 2022-12-06 DIAGNOSIS — N52.9 ERECTILE DYSFUNCTION, UNSPECIFIED ERECTILE DYSFUNCTION TYPE: ICD-10-CM

## 2022-12-06 DIAGNOSIS — M25.511 CHRONIC RIGHT SHOULDER PAIN: ICD-10-CM

## 2022-12-06 PROCEDURE — 99214 OFFICE O/P EST MOD 30 MIN: CPT | Performed by: FAMILY MEDICINE

## 2022-12-06 PROCEDURE — 3008F BODY MASS INDEX DOCD: CPT | Performed by: FAMILY MEDICINE

## 2022-12-06 RX ORDER — SILDENAFIL 100 MG/1
100 TABLET, FILM COATED ORAL DAILY PRN
Qty: 10 TABLET | Refills: 1 | Status: SHIPPED | OUTPATIENT
Start: 2022-12-06 | End: 2024-01-23

## 2022-12-06 ASSESSMENT — PATIENT HEALTH QUESTIONNAIRE - PHQ9: SUM OF ALL RESPONSES TO PHQ9 QUESTIONS 1 & 2: 0

## 2022-12-06 ASSESSMENT — ENCOUNTER SYMPTOMS: ARTHRALGIAS: 1

## 2022-12-06 NOTE — PROGRESS NOTES
[facility]  Dr. Nan Mccain  [address] [place] [address]     Dannie Hardin is a 72 y.o. male who present for   Chief Complaint   Patient presents with   • Forms/questionnaires        Patient feels well.  Doing resistence exercises & lifting about 170 lbs.  Used to run 1/2 marathons, has had several surgeries on feet.  He continues to take Neurontin.  Wants refill for Viagra.  Needs form completed to work as a .          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 (CMS/HCC) 05/30/2022       Past Surgical History:   Procedure Laterality Date   • BUNIONECTOMY     • CHOLECYSTECTOMY     • COLON SURGERY  03/12/2015   • COLON SURGERY  07/13/2015   • COLONOSCOPY     • FOOT SURGERY Right    • HEMORROIDECTOMY  2020   • LUMBAR LAMINECTOMY      L3-L4   • RESECTION SMALL BOWEL / CLOSURE ILEOSTOMY         Social History     Occupational History   • Occupation: retired   Tobacco Use   • Smoking status: Former   • Smokeless tobacco: Never   • Tobacco comments:     Quit  25 yrs ago   Vaping Use   • Vaping Use: Former   Substance and Sexual Activity   • Alcohol use: No   • Drug use: No   • Sexual activity: Defer        Family History   Problem Relation Age of Onset   • Hyperlipidemia Biological Mother    • Angina Biological Mother    • Hypertension Biological Mother    • Stroke Biological Father    • Diabetes Biological Father        No known allergies      Current Outpatient Medications:   •  ascorbic acid, vitamin C, (VITAMIN C) 1,000 mg tablet, Take 100 mg by mouth daily.  , Disp: , Rfl:   •  aspirin 81 MG oral suspension, , Disp: , Rfl:   •  atorvastatin (LIPITOR) 80 mg tablet, Take 80 mg by mouth once daily., Disp: , Rfl:   •  b complex  vitamins tablet, Take 1 tablet by mouth daily., Disp: , Rfl:   •  cholecalciferol, vitamin D3, 25 mcg (1,000 unit) capsule, Take 1,000 Units by mouth daily., Disp: , Rfl:   •  chromium picolinate 1,000 mcg tablet, Take 1 capsule by mouth daily. Stopped for surgery , Disp: , Rfl:   •  clopidogreL (PLAVIX) 75 mg tablet, Take 75 mg by mouth once daily., Disp: , Rfl:   •  cyanocobalamin, vitamin B-12, (VITAMIN B-12 ORAL), Take 100 mcg by mouth daily., Disp: , Rfl:   •  fluticasone propionate (FLONASE) 50 mcg/actuation nasal spray, Administer 1 spray into each nostril daily., Disp: 1 g, Rfl: 3  •  gabapentin (NEURONTIN) 600 mg tablet, Take 1 tablet (600 mg total) by mouth 3 (three) times a day., Disp: 90 tablet, Rfl: 2  •  garlic 1,000 mg capsule, Take by mouth daily. 6 capsules daily ( Stopped for surgery) , Disp: , Rfl:   •  glucos sul 2KCl/msm/chond/C/Mn (GLUCOSAMINE CHONDROITIN ORAL), Take 1 tablet by mouth daily. Stopped for surgery, Disp: , Rfl:   •  lactobacillus 3/FOS/pantethine (PROBIOTIC AND ACIDOPHILUS ORAL), Take 1 capsule by mouth daily.  , Disp: , Rfl:   •  magnesium 30 mg tablet, Take 30 mg by mouth 2 (two) times a day., Disp: , Rfl:   •  metoprolol succinate XL (TOPROL-XL) 25 mg 24 hr tablet, Take 25 mg by mouth once daily., Disp: , Rfl:   •  multivitamin (THERAGRAN) tablet, Take 1 tablet by mouth daily.  , Disp: , Rfl:   •  niacin 500 mg tablet, Take 500 mg by mouth daily with breakfast., Disp: , Rfl:   •  nitroglycerin (NITROSTAT) 0.4 mg SL tablet, TAKE 1 TABLET BY MOUTH SUBLINGUALLY EVERY 5 MINS AS NEEDED FOR CHEST PAIN, Disp: , Rfl:   •  omega 3-dha-epa-fish oil 138-183-1,000 mg capsule, Take 1 capsule by mouth daily. Stopped for surgery , Disp: , Rfl:   •  omeprazole (PriLOSEC) 20 mg capsule, Take 20 mg by mouth daily before breakfast., Disp: , Rfl:   •  ramipriL (ALTACE) 2.5 mg capsule, Take by mouth once daily., Disp: , Rfl:   •  sildenafiL (VIAGRA) 100 mg tablet, Take 1 tablet (100 mg total) by  "mouth daily as needed for erectile dysfunction., Disp: 10 tablet, Rfl: 1  •  thiamine 100 mg tablet, Take 100 mg by mouth daily., Disp: , Rfl:   •  vitamin E acetate (VITAMIN E ORAL), Take 400 Units by mouth once., Disp: , Rfl:     Review of Systems   Musculoskeletal: Positive for arthralgias (Rt shoulder).       Vitals:    12/06/22 0931   BP: 115/75   BP Location: Right upper arm   Patient Position: Sitting   Pulse: 68   Resp: 14   Temp: 36.4 °C (97.5 °F)   TempSrc: Oral   SpO2: 96%   Weight: 93.2 kg (205 lb 6.4 oz)   Height: 1.816 m (5' 11.5\")     Body mass index is 28.25 kg/m².    Physical Exam  Vitals reviewed.   Constitutional:       General: He is not in acute distress.  HENT:      Right Ear: Tympanic membrane normal.      Left Ear: Tympanic membrane normal.      Mouth/Throat:      Mouth: Mucous membranes are moist.   Eyes:      Conjunctiva/sclera: Conjunctivae normal.   Neck:      Thyroid: No thyromegaly.   Cardiovascular:      Rate and Rhythm: Normal rate and regular rhythm.      Heart sounds: No murmur heard.     Comments: Engorged veins on upper and lower extremities.  Pulmonary:      Effort: Pulmonary effort is normal.      Breath sounds: Normal breath sounds. No rales.   Abdominal:      General: Bowel sounds are normal.      Palpations: Abdomen is soft. There is no mass.      Tenderness: There is no abdominal tenderness.   Musculoskeletal:      Cervical back: Normal range of motion.      Right lower leg: No edema.      Left lower leg: No edema.      Comments: Right shoulder extension limited to 45 degrees.   Skin:     Findings: No rash.   Neurological:      General: No focal deficit present.      Mental Status: He is alert and oriented to person, place, and time.      Gait: Gait normal.   Psychiatric:         Mood and Affect: Mood normal.             Assessment   Problem List Items Addressed This Visit        Nervous    Neuropathy - Primary    Relevant Orders    CBC and Differential    Comprehensive " metabolic panel    Lipid panel    TSH 3rd Generation    Hemoglobin A1c    Magnesium    Vitamin B12    QuantiFERON-TB GOLD PLUS       Circulatory    Coronary artery disease involving coronary bypass graft of native heart       Genitourinary    Erectile dysfunction     Agreed to prescribe Viagra.  Patient aware not to use along with nitroglycerin.  He will follow-up with cardiology next month.            Musculoskeletal    Chronic right shoulder pain     He has had 2 injections previously and Ortho has told him he has severe            Other    Hypercholesteremia     Patient will get repeat blood work done prior to next visit with cardiology.                Nan Mccain MD  12/9/2022

## 2022-12-06 NOTE — ASSESSMENT & PLAN NOTE
Agreed to prescribe Viagra.  Patient aware not to use along with nitroglycerin.  He will follow-up with cardiology next month.

## 2022-12-09 DIAGNOSIS — R52 PAIN: ICD-10-CM

## 2022-12-09 RX ORDER — GABAPENTIN 600 MG/1
600 TABLET ORAL 3 TIMES DAILY
Qty: 90 TABLET | Refills: 2 | Status: SHIPPED | OUTPATIENT
Start: 2022-12-09 | End: 2022-12-12 | Stop reason: SDUPTHER

## 2022-12-12 DIAGNOSIS — R52 PAIN: ICD-10-CM

## 2022-12-12 RX ORDER — GABAPENTIN 600 MG/1
600 TABLET ORAL 3 TIMES DAILY
Qty: 90 TABLET | Refills: 2 | Status: SHIPPED | OUTPATIENT
Start: 2022-12-12 | End: 2023-05-24 | Stop reason: SDUPTHER

## 2023-01-04 NOTE — PERIOPERATIVE NURSING NOTE
[facility]  PHYSICAL THERAPY MISSED TREATMENT NOTE  ACUTE CARE  [facility]              Missed Treatment  Per tech pts  just took pt off the floor to go outside and smoke. Patient tolerated PO . Voided in BR, seen by Dr Hernandez. Patient is ready to go home.

## 2023-01-18 LAB
ALBUMIN SERPL-MCNC: 4.4 G/DL (ref 3.7–4.7)
ALBUMIN/GLOB SERPL: 1.6 {RATIO} (ref 1.2–2.2)
ALP SERPL-CCNC: 86 IU/L (ref 44–121)
ALT SERPL-CCNC: 36 IU/L (ref 0–44)
AST SERPL-CCNC: 29 IU/L (ref 0–40)
BASOPHILS # BLD AUTO: 0 X10E3/UL (ref 0–0.2)
BASOPHILS NFR BLD AUTO: 1 %
BILIRUB SERPL-MCNC: 2 MG/DL (ref 0–1.2)
BUN SERPL-MCNC: 22 MG/DL (ref 8–27)
BUN/CREAT SERPL: 24 (ref 10–24)
CALCIUM SERPL-MCNC: 9.5 MG/DL (ref 8.6–10.2)
CHLORIDE SERPL-SCNC: 103 MMOL/L (ref 96–106)
CHOLEST SERPL-MCNC: 107 MG/DL (ref 100–199)
CO2 SERPL-SCNC: 25 MMOL/L (ref 20–29)
CREAT SERPL-MCNC: 0.92 MG/DL (ref 0.76–1.27)
EGFRCR SERPLBLD CKD-EPI 2021: 88 ML/MIN/1.73
EOSINOPHIL # BLD AUTO: 0.2 X10E3/UL (ref 0–0.4)
EOSINOPHIL NFR BLD AUTO: 2 %
ERYTHROCYTE [DISTWIDTH] IN BLOOD BY AUTOMATED COUNT: 12.7 % (ref 11.6–15.4)
GLOBULIN SER CALC-MCNC: 2.8 G/DL (ref 1.5–4.5)
GLUCOSE SERPL-MCNC: 106 MG/DL (ref 70–99)
HBA1C MFR BLD: 5.6 % (ref 4.8–5.6)
HCT VFR BLD AUTO: 42.8 % (ref 37.5–51)
HDLC SERPL-MCNC: 39 MG/DL
HGB BLD-MCNC: 14.5 G/DL (ref 13–17.7)
IMM GRANULOCYTES # BLD AUTO: 0 X10E3/UL (ref 0–0.1)
IMM GRANULOCYTES NFR BLD AUTO: 0 %
LDLC SERPL CALC-MCNC: 44 MG/DL (ref 0–99)
LYMPHOCYTES # BLD AUTO: 1.7 X10E3/UL (ref 0.7–3.1)
LYMPHOCYTES NFR BLD AUTO: 21 %
MAGNESIUM SERPL-MCNC: 1.8 MG/DL (ref 1.6–2.3)
MCH RBC QN AUTO: 28.9 PG (ref 26.6–33)
MCHC RBC AUTO-ENTMCNC: 33.9 G/DL (ref 31.5–35.7)
MCV RBC AUTO: 85 FL (ref 79–97)
MONOCYTES # BLD AUTO: 0.6 X10E3/UL (ref 0.1–0.9)
MONOCYTES NFR BLD AUTO: 7 %
NEUTROPHILS # BLD AUTO: 5.4 X10E3/UL (ref 1.4–7)
NEUTROPHILS NFR BLD AUTO: 69 %
PLATELET # BLD AUTO: 232 X10E3/UL (ref 150–450)
POTASSIUM SERPL-SCNC: 4.8 MMOL/L (ref 3.5–5.2)
PROT SERPL-MCNC: 7.2 G/DL (ref 6–8.5)
RBC # BLD AUTO: 5.02 X10E6/UL (ref 4.14–5.8)
SODIUM SERPL-SCNC: 143 MMOL/L (ref 134–144)
TRIGL SERPL-MCNC: 140 MG/DL (ref 0–149)
TSH SERPL DL<=0.005 MIU/L-ACNC: 2.2 UIU/ML (ref 0.45–4.5)
VIT B12 SERPL-MCNC: 548 PG/ML (ref 232–1245)
VLDLC SERPL CALC-MCNC: 24 MG/DL (ref 5–40)
WBC # BLD AUTO: 7.9 X10E3/UL (ref 3.4–10.8)

## 2023-01-19 LAB
GAMMA INTERFERON BACKGROUND BLD IA-ACNC: 0.01 IU/ML
LAB CORP QUANTIFERON INCUBATION: NORMAL
M TB IFN-G BLD-IMP: NEGATIVE
M TB IFN-G CD4+ T-CELLS BLD-ACNC: 0.06 IU/ML
M TBIFN-G CD4+ CD8+T-CELLS BLD-ACNC: 0.16 IU/ML
MITOGEN IGNF BLD-ACNC: >10 IU/ML
SERVICE CMNT-IMP: NORMAL

## 2023-01-20 NOTE — RESULT ENCOUNTER NOTE
Please let patient know his blood work results are fine except borderline high blood sugar.  Lipid panel has improved since last year.  Please ask him to continue on low-fat low-carb diet and exercise regularly.

## 2023-04-25 ENCOUNTER — APPOINTMENT (RX ONLY)
Dept: URBAN - METROPOLITAN AREA CLINIC 374 | Facility: CLINIC | Age: 73
Setting detail: DERMATOLOGY
End: 2023-04-25

## 2023-04-25 DIAGNOSIS — D485 NEOPLASM OF UNCERTAIN BEHAVIOR OF SKIN: ICD-10-CM

## 2023-04-25 PROBLEM — D48.5 NEOPLASM OF UNCERTAIN BEHAVIOR OF SKIN: Status: ACTIVE | Noted: 2023-04-25

## 2023-04-25 PROCEDURE — ? BIOPSY BY SHAVE METHOD

## 2023-04-25 PROCEDURE — 11102 TANGNTL BX SKIN SINGLE LES: CPT

## 2023-04-25 PROCEDURE — ? COUNSELING

## 2023-04-25 ASSESSMENT — LOCATION DETAILED DESCRIPTION DERM: LOCATION DETAILED: LEFT PROXIMAL PRETIBIAL REGION

## 2023-04-25 ASSESSMENT — LOCATION SIMPLE DESCRIPTION DERM: LOCATION SIMPLE: LEFT PRETIBIAL REGION

## 2023-04-25 ASSESSMENT — LOCATION ZONE DERM: LOCATION ZONE: LEG

## 2023-04-25 NOTE — PROCEDURE: BIOPSY BY SHAVE METHOD
Detail Level: Detailed
Depth Of Biopsy: dermis
Was A Bandage Applied: Yes
Size Of Lesion In Cm: 0
Biopsy Type: H and E
Biopsy Method: Personna blade
Anesthesia Type: 1% lidocaine without epinephrine
Anesthesia Volume In Cc (Will Not Render If 0): 3
Hemostasis: Drysol
Wound Care: Petrolatum
Dressing: bandage
Destruction After The Procedure: No
Type Of Destruction Used: Curettage
Curettage Text: The wound bed was treated with curettage after the biopsy was performed.
Cryotherapy Text: The wound bed was treated with cryotherapy after the biopsy was performed.
Electrodesiccation Text: The wound bed was treated with electrodesiccation after the biopsy was performed.
Electrodesiccation And Curettage Text: The wound bed was treated with electrodesiccation and curettage after the biopsy was performed.
Silver Nitrate Text: The wound bed was treated with silver nitrate after the biopsy was performed.
Lab: 6
Consent: Written consent was obtained and risks were reviewed including but not limited to scarring, infection, bleeding, scabbing, incomplete removal, nerve damage and allergy to anesthesia.
Post-Care Instructions: I reviewed with the patient in detail post-care instructions. Patient is to keep the bandage on biopsy site overnight. After 24 hours can wash biopsy site gently with soap and water,  followed by vaseline and band aid, repeat daily for 1-2 weeks until site healed.
Notification Instructions: Patient will be notified of biopsy results. However, patient instructed to call the office if not contacted within 2 weeks.
Billing Type: Third-Party Bill
Information: Selecting Yes will display possible errors in your note based on the variables you have selected. This validation is only offered as a suggestion for you. PLEASE NOTE THAT THE VALIDATION TEXT WILL BE REMOVED WHEN YOU FINALIZE YOUR NOTE. IF YOU WANT TO FAX A PRELIMINARY NOTE YOU WILL NEED TO TOGGLE THIS TO 'NO' IF YOU DO NOT WANT IT IN YOUR FAXED NOTE.

## 2023-04-25 NOTE — HPI: EVALUATION OF SKIN LESION(S)
What Type Of Note Output Would You Prefer (Optional)?: Bullet Format
Hpi Title: Evaluation of a Skin Lesion
How Severe Are Your Spot(S)?: mild
Terbinafine Counseling: Patient counseling regarding adverse effects of terbinafine including but not limited to headache, diarrhea, rash, upset stomach, liver function test abnormalities, itching, taste/smell disturbance, nausea, abdominal pain, and flatulence.  There is a rare possibility of liver failure that can occur when taking terbinafine.  The patient understands that a baseline LFT and kidney function test may be required. The patient verbalized understanding of the proper use and possible adverse effects of terbinafine.  All of the patient's questions and concerns were addressed.

## 2023-05-02 ENCOUNTER — APPOINTMENT (RX ONLY)
Dept: URBAN - METROPOLITAN AREA CLINIC 23 | Facility: CLINIC | Age: 73
Setting detail: DERMATOLOGY
End: 2023-05-02

## 2023-05-02 DIAGNOSIS — L82.1 OTHER SEBORRHEIC KERATOSIS: ICD-10-CM

## 2023-05-02 DIAGNOSIS — D22 MELANOCYTIC NEVI: ICD-10-CM

## 2023-05-02 DIAGNOSIS — L81.4 OTHER MELANIN HYPERPIGMENTATION: ICD-10-CM

## 2023-05-02 DIAGNOSIS — D485 NEOPLASM OF UNCERTAIN BEHAVIOR OF SKIN: ICD-10-CM

## 2023-05-02 DIAGNOSIS — L82.0 INFLAMED SEBORRHEIC KERATOSIS: ICD-10-CM

## 2023-05-02 DIAGNOSIS — D18.0 HEMANGIOMA: ICD-10-CM

## 2023-05-02 PROBLEM — D22.5 MELANOCYTIC NEVI OF TRUNK: Status: ACTIVE | Noted: 2023-05-02

## 2023-05-02 PROBLEM — D18.01 HEMANGIOMA OF SKIN AND SUBCUTANEOUS TISSUE: Status: ACTIVE | Noted: 2023-05-02

## 2023-05-02 PROBLEM — D48.5 NEOPLASM OF UNCERTAIN BEHAVIOR OF SKIN: Status: ACTIVE | Noted: 2023-05-02

## 2023-05-02 PROCEDURE — 99213 OFFICE O/P EST LOW 20 MIN: CPT

## 2023-05-02 PROCEDURE — ? ADDITIONAL NOTES

## 2023-05-02 PROCEDURE — ? COUNSELING

## 2023-05-02 PROCEDURE — ? FULL BODY SKIN EXAM

## 2023-05-02 PROCEDURE — ? SUNSCREEN RECOMMENDATIONS

## 2023-05-02 PROCEDURE — ? PHOTO-DOCUMENTATION

## 2023-05-02 ASSESSMENT — LOCATION ZONE DERM
LOCATION ZONE: LEG
LOCATION ZONE: SCALP
LOCATION ZONE: TRUNK

## 2023-05-02 ASSESSMENT — LOCATION SIMPLE DESCRIPTION DERM
LOCATION SIMPLE: LEFT PRETIBIAL REGION
LOCATION SIMPLE: SCALP
LOCATION SIMPLE: UPPER BACK

## 2023-05-02 ASSESSMENT — LOCATION DETAILED DESCRIPTION DERM
LOCATION DETAILED: SUPERIOR THORACIC SPINE
LOCATION DETAILED: RIGHT SUPERIOR PARIETAL SCALP
LOCATION DETAILED: LEFT DISTAL PRETIBIAL REGION

## 2023-05-02 NOTE — PROCEDURE: PHOTO-DOCUMENTATION
Photo Preface (Leave Blank If You Do Not Want): Photographs were obtained today
Details (Free Text): re-eval in 1 month
Detail Level: Detailed

## 2023-05-24 DIAGNOSIS — R52 PAIN: ICD-10-CM

## 2023-05-24 RX ORDER — GABAPENTIN 600 MG/1
600 TABLET ORAL 3 TIMES DAILY
Qty: 90 TABLET | Refills: 2 | Status: SHIPPED | OUTPATIENT
Start: 2023-05-24 | End: 2023-06-02 | Stop reason: SDUPTHER

## 2023-05-30 ENCOUNTER — APPOINTMENT (RX ONLY)
Dept: URBAN - METROPOLITAN AREA CLINIC 23 | Facility: CLINIC | Age: 73
Setting detail: DERMATOLOGY
End: 2023-05-30

## 2023-05-30 DIAGNOSIS — L82.0 INFLAMED SEBORRHEIC KERATOSIS: ICD-10-CM

## 2023-05-30 DIAGNOSIS — L81.4 OTHER MELANIN HYPERPIGMENTATION: ICD-10-CM

## 2023-05-30 PROCEDURE — 99212 OFFICE O/P EST SF 10 MIN: CPT | Mod: 25

## 2023-05-30 PROCEDURE — ? LIQUID NITROGEN

## 2023-05-30 PROCEDURE — ? COUNSELING

## 2023-05-30 PROCEDURE — 17110 DESTRUCTION B9 LES UP TO 14: CPT

## 2023-05-30 ASSESSMENT — LOCATION DETAILED DESCRIPTION DERM
LOCATION DETAILED: RIGHT MEDIAL FRONTAL SCALP
LOCATION DETAILED: LEFT DISTAL PRETIBIAL REGION

## 2023-05-30 ASSESSMENT — LOCATION SIMPLE DESCRIPTION DERM
LOCATION SIMPLE: RIGHT SCALP
LOCATION SIMPLE: LEFT PRETIBIAL REGION

## 2023-05-30 ASSESSMENT — LOCATION ZONE DERM
LOCATION ZONE: SCALP
LOCATION ZONE: LEG

## 2023-05-30 NOTE — PROCEDURE: LIQUID NITROGEN
Show Spray Paint Technique Variable?: Yes
Render Post-Care Instructions In Note?: no
Spray Paint Text: The liquid nitrogen was applied to the skin utilizing a spray paint frosting technique.
Medical Necessity Information: It is in your best interest to select a reason for this procedure from the list below. All of these items fulfill various CMS LCD requirements except the new and changing color options.
Post-Care Instructions: I reviewed with the patient in detail post-care instructions. Patient is to wear sunprotection, and avoid picking at any of the treated lesions. Pt may apply Vaseline to crusted or scabbing areas.
Medical Necessity Clause: This procedure was medically necessary because the lesions that were treated were:
Number Of Freeze-Thaw Cycles: 3 freeze-thaw cycles
Consent: The patient's consent was obtained including but not limited to risks of crusting, scabbing, blistering, scarring, darker or lighter pigmentary change, recurrence, incomplete removal and infection.
Detail Level: Detailed

## 2023-06-02 DIAGNOSIS — R52 PAIN: ICD-10-CM

## 2023-06-02 RX ORDER — GABAPENTIN 600 MG/1
600 TABLET ORAL 3 TIMES DAILY
Qty: 90 TABLET | Refills: 3 | Status: SHIPPED | OUTPATIENT
Start: 2023-06-02 | End: 2023-07-19

## 2023-07-18 DIAGNOSIS — R52 PAIN: ICD-10-CM

## 2023-07-19 RX ORDER — GABAPENTIN 600 MG/1
600 TABLET ORAL 3 TIMES DAILY
Qty: 270 TABLET | Refills: 3 | Status: SHIPPED | OUTPATIENT
Start: 2023-07-19 | End: 2024-08-12

## 2023-11-16 ENCOUNTER — OFFICE VISIT (OUTPATIENT)
Dept: FAMILY MEDICINE | Facility: CLINIC | Age: 73
End: 2023-11-16
Payer: COMMERCIAL

## 2023-11-16 VITALS
TEMPERATURE: 98.2 F | DIASTOLIC BLOOD PRESSURE: 76 MMHG | SYSTOLIC BLOOD PRESSURE: 128 MMHG | WEIGHT: 199.8 LBS | HEART RATE: 77 BPM | HEIGHT: 72 IN | BODY MASS INDEX: 27.06 KG/M2 | OXYGEN SATURATION: 95 %

## 2023-11-16 DIAGNOSIS — B18.2 CHRONIC HEPATITIS C WITHOUT HEPATIC COMA (CMS/HCC): ICD-10-CM

## 2023-11-16 DIAGNOSIS — Z00.00 ROUTINE GENERAL MEDICAL EXAMINATION AT A HEALTH CARE FACILITY: ICD-10-CM

## 2023-11-16 DIAGNOSIS — I25.810 CORONARY ARTERY DISEASE INVOLVING CORONARY BYPASS GRAFT OF NATIVE HEART WITHOUT ANGINA PECTORIS: Primary | ICD-10-CM

## 2023-11-16 DIAGNOSIS — G89.29 CHRONIC RIGHT SHOULDER PAIN: ICD-10-CM

## 2023-11-16 DIAGNOSIS — M25.511 CHRONIC RIGHT SHOULDER PAIN: ICD-10-CM

## 2023-11-16 DIAGNOSIS — E53.8 VITAMIN B12 DEFICIENCY: ICD-10-CM

## 2023-11-16 PROCEDURE — 99397 PER PM REEVAL EST PAT 65+ YR: CPT | Performed by: FAMILY MEDICINE

## 2023-11-16 PROCEDURE — 3008F BODY MASS INDEX DOCD: CPT | Performed by: FAMILY MEDICINE

## 2023-11-16 NOTE — ASSESSMENT & PLAN NOTE
He has been seeing [facility] which he does not want to continue to do.  Advised him to consult [facility] orthopedics for second opinion.  He has not had MRI for several years.

## 2023-11-16 NOTE — PROGRESS NOTES
[facility]  Dr. Nan Mccain  [address] [place] [address]     Dannie Hardin is a 73 y.o. male who present for   Chief Complaint   Patient presents with    Annual Exam        Accompanied by his wife who is much younger to him.  Patient says he wants to take care of himself to live long.  Sees ortho x Rt shoulder pain x 2 yrs  Goes to the gym frequently, has changed certain exercises so that the pain does not get worse.  Cannot turn on the bed  W/o holding the shoulder.  He has thought of using CBD for pain control.  At the same time he does not want to because of his history of addiction.  Saw cardiologist and Plavix and ramipril were discontinued.  He wants to get blood work done again.          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 (CMS/HCC) 05/30/2022       Past Surgical History:   Procedure Laterality Date    BUNIONECTOMY      CHOLECYSTECTOMY      COLON SURGERY  03/12/2015    COLON SURGERY  07/13/2015    COLONOSCOPY      FOOT SURGERY Right     HEMORROIDECTOMY  2020    LUMBAR LAMINECTOMY      L3-L4    RESECTION SMALL BOWEL / CLOSURE ILEOSTOMY         Social History     Occupational History    Occupation: retired   Tobacco Use    Smoking status: Former    Smokeless tobacco: Never    Tobacco comments:     Quit  25 yrs ago   Vaping Use    Vaping Use: Former   Substance and Sexual Activity    Alcohol use: No    Drug use: No    Sexual activity: Defer        Family History   Problem Relation Age of Onset    Hyperlipidemia Biological Mother     Angina Biological Mother     Hypertension Biological Mother     Stroke Biological Father     Diabetes Biological Father        No known allergies      Current Outpatient Medications:      ascorbic acid, vitamin C, (VITAMIN C) 1,000 mg tablet, Take 100 mg by mouth daily.  , Disp: , Rfl:     aspirin 81 MG oral suspension, , Disp: , Rfl:     atorvastatin (LIPITOR) 80 mg tablet, Take 80 mg by mouth once daily., Disp: , Rfl:     b complex vitamins tablet, Take 1 tablet by mouth daily., Disp: , Rfl:     cholecalciferol, vitamin D3, 25 mcg (1,000 unit) capsule, Take 1,000 Units by mouth daily., Disp: , Rfl:     chromium picolinate 1,000 mcg tablet, Take 1 capsule by mouth daily. Stopped for surgery , Disp: , Rfl:     cyanocobalamin, vitamin B-12, (VITAMIN B-12 ORAL), Take 100 mcg by mouth daily., Disp: , Rfl:     fluticasone propionate (FLONASE) 50 mcg/actuation nasal spray, Administer 1 spray into each nostril daily., Disp: 1 g, Rfl: 3    gabapentin (NEURONTIN) 600 mg tablet, Take 1 tablet (600 mg total) by mouth 3 (three) times a day., Disp: 270 tablet, Rfl: 3    garlic 1,000 mg capsule, Take by mouth daily. 6 capsules daily ( Stopped for surgery) , Disp: , Rfl:     glucos sul 2KCl/msm/chond/C/Mn (GLUCOSAMINE CHONDROITIN ORAL), Take 1 tablet by mouth daily. Stopped for surgery, Disp: , Rfl:     lactobacillus 3/FOS/pantethine (PROBIOTIC AND ACIDOPHILUS ORAL), Take 1 capsule by mouth daily.  , Disp: , Rfl:     magnesium 30 mg tablet, Take 30 mg by mouth 2 (two) times a day., Disp: , Rfl:     metoprolol succinate XL (TOPROL-XL) 25 mg 24 hr tablet, Take 25 mg by mouth once daily., Disp: , Rfl:     multivitamin (THERAGRAN) tablet, Take 1 tablet by mouth daily.  , Disp: , Rfl:     niacin 500 mg tablet, Take 500 mg by mouth daily with breakfast., Disp: , Rfl:     omega 3-dha-epa-fish oil 138-183-1,000 mg capsule, Take 1 capsule by mouth daily. Stopped for surgery , Disp: , Rfl:     omeprazole (PriLOSEC) 20 mg capsule, Take 20 mg by mouth daily before breakfast., Disp: , Rfl:     thiamine 100 mg tablet, Take 100 mg by mouth daily., Disp: , Rfl:     vitamin E acetate (VITAMIN E ORAL), Take  "400 Units by mouth once., Disp: , Rfl:     sildenafiL (VIAGRA) 100 mg tablet, Take 1 tablet (100 mg total) by mouth daily as needed for erectile dysfunction., Disp: 10 tablet, Rfl: 1    Review of Systems    Vitals:    11/16/23 1126   BP: 128/76   BP Location: Right upper arm   Patient Position: Sitting   Pulse: 77   Temp: 36.8 °C (98.2 °F)   TempSrc: Oral   SpO2: 95%   Weight: 90.6 kg (199 lb 12.8 oz)   Height: 1.816 m (5' 11.5\")     Body mass index is 27.48 kg/m².    Physical Exam  Vitals reviewed.   Constitutional:       General: He is not in acute distress.  HENT:      Right Ear: Tympanic membrane normal.      Left Ear: Tympanic membrane normal.      Mouth/Throat:      Mouth: Mucous membranes are moist.   Eyes:      Conjunctiva/sclera: Conjunctivae normal.   Neck:      Thyroid: No thyromegaly.   Cardiovascular:      Rate and Rhythm: Normal rate and regular rhythm.      Heart sounds: No murmur heard.  Pulmonary:      Effort: Pulmonary effort is normal.      Breath sounds: Normal breath sounds. No rales.   Abdominal:      General: Bowel sounds are normal.      Palpations: Abdomen is soft. There is no mass.      Tenderness: There is no abdominal tenderness.   Musculoskeletal:      Cervical back: Normal range of motion.      Right lower leg: No edema.      Left lower leg: No edema.   Skin:     Findings: No rash.   Neurological:      General: No focal deficit present.      Mental Status: He is alert and oriented to person, place, and time.      Gait: Gait normal.   Psychiatric:         Mood and Affect: Mood normal.             Assessment   Problem List Items Addressed This Visit        Circulatory    Coronary artery disease involving coronary bypass graft of native heart - Primary    Relevant Orders    CBC and Differential    Comprehensive metabolic panel    Lipid panel    TSH 3rd Generation    Urinalysis with Reflex Culture (ED and Outpatient only)    Vitamin B12    Vitamin D 25 hydroxy    Iron and TIBC       " Digestive    RESOLVED: Chronic hepatitis C (CMS/HCC)    Relevant Orders    CBC and Differential    Comprehensive metabolic panel    Lipid panel    TSH 3rd Generation    Urinalysis with Reflex Culture (ED and Outpatient only)    Vitamin B12    Vitamin D 25 hydroxy    Iron and TIBC       Musculoskeletal    Chronic right shoulder pain     He has been seeing [facility] which he does not want to continue to do.  Advised him to consult [facility] orthopedics for second opinion.  He has not had MRI for several years.         Relevant Orders    CBC and Differential    Comprehensive metabolic panel    Lipid panel    TSH 3rd Generation    Urinalysis with Reflex Culture (ED and Outpatient only)    Vitamin B12    Vitamin D 25 hydroxy    Iron and TIBC       Endocrine/Metabolic    Vitamin B12 deficiency    Relevant Orders    CBC and Differential    Comprehensive metabolic panel    Lipid panel    TSH 3rd Generation    Urinalysis with Reflex Culture (ED and Outpatient only)    Vitamin B12    Vitamin D 25 hydroxy    Iron and TIBC       Other    Routine general medical examination at a health care facility           Nan Mccain MD  11/16/2023

## 2023-12-28 ENCOUNTER — TELEPHONE (OUTPATIENT)
Dept: FAMILY MEDICINE | Facility: CLINIC | Age: 73
End: 2023-12-28
Payer: COMMERCIAL

## 2023-12-28 NOTE — TELEPHONE ENCOUNTER
[facility] Appointment Request   Provider: Dr Han  Appointment Type: Pre op  Reason for Visit: Shoulder replacement 1/05  Available Day and Time:   Best Contact Number: Cell ([phone])    The practice will reach out to schedule your appointment within the next 2 business days.

## 2024-01-05 ENCOUNTER — HOSPITAL ENCOUNTER (EMERGENCY)
Facility: HOSPITAL | Age: 74
Discharge: LEFT BEFORE DISCHARGE | End: 2024-01-05
Attending: STUDENT IN AN ORGANIZED HEALTH CARE EDUCATION/TRAINING PROGRAM
Payer: COMMERCIAL

## 2024-01-05 ENCOUNTER — APPOINTMENT (EMERGENCY)
Dept: RADIOLOGY | Facility: HOSPITAL | Age: 74
End: 2024-01-05
Payer: COMMERCIAL

## 2024-01-05 VITALS
HEART RATE: 109 BPM | SYSTOLIC BLOOD PRESSURE: 142 MMHG | OXYGEN SATURATION: 93 % | RESPIRATION RATE: 21 BRPM | DIASTOLIC BLOOD PRESSURE: 82 MMHG | BODY MASS INDEX: 26.41 KG/M2 | HEIGHT: 72 IN | TEMPERATURE: 96.6 F | WEIGHT: 195 LBS

## 2024-01-05 DIAGNOSIS — R09.02 HYPOXIA: Primary | ICD-10-CM

## 2024-01-05 LAB
ALBUMIN SERPL-MCNC: 4.1 G/DL (ref 3.5–5.7)
ALP SERPL-CCNC: 74 IU/L (ref 34–125)
ALT SERPL-CCNC: 140 IU/L (ref 7–52)
ANION GAP SERPL CALC-SCNC: 10 MEQ/L (ref 3–15)
AST SERPL-CCNC: 158 IU/L (ref 13–39)
BASOPHILS # BLD: 0.03 K/UL (ref 0.01–0.1)
BASOPHILS NFR BLD: 0.4 %
BILIRUB SERPL-MCNC: 2.5 MG/DL (ref 0.3–1.2)
BNP SERPL-MCNC: 23 PG/ML
BUN SERPL-MCNC: 19 MG/DL (ref 7–25)
CALCIUM SERPL-MCNC: 8.7 MG/DL (ref 8.6–10.3)
CHLORIDE SERPL-SCNC: 105 MEQ/L (ref 98–107)
CO2 SERPL-SCNC: 27 MEQ/L (ref 21–31)
CREAT SERPL-MCNC: 0.9 MG/DL (ref 0.7–1.3)
DIFFERENTIAL METHOD BLD: ABNORMAL
EGFRCR SERPLBLD CKD-EPI 2021: >60 ML/MIN/1.73M*2
EOSINOPHIL # BLD: 0.01 K/UL (ref 0.04–0.54)
EOSINOPHIL NFR BLD: 0.1 %
ERYTHROCYTE [DISTWIDTH] IN BLOOD BY AUTOMATED COUNT: 12.9 % (ref 11.6–14.4)
GLUCOSE SERPL-MCNC: 161 MG/DL (ref 70–99)
HCT VFR BLDCO AUTO: 42.2 % (ref 40.1–51)
HGB BLD-MCNC: 13.6 G/DL (ref 13.7–17.5)
IMM GRANULOCYTES # BLD AUTO: 0.02 K/UL (ref 0–0.08)
IMM GRANULOCYTES NFR BLD AUTO: 0.3 %
LYMPHOCYTES # BLD: 0.54 K/UL (ref 1.2–3.5)
LYMPHOCYTES NFR BLD: 7 %
MCH RBC QN AUTO: 27.8 PG (ref 28–33.2)
MCHC RBC AUTO-ENTMCNC: 32.2 G/DL (ref 32.2–36.5)
MCV RBC AUTO: 86.3 FL (ref 83–98)
MONOCYTES # BLD: 0.09 K/UL (ref 0.3–1)
MONOCYTES NFR BLD: 1.2 %
NEUTROPHILS # BLD: 6.99 K/UL (ref 1.7–7)
NEUTS SEG NFR BLD: 91 %
NRBC BLD-RTO: 0 %
PDW BLD AUTO: 9.9 FL (ref 9.4–12.4)
PLATELET # BLD AUTO: 148 K/UL (ref 150–350)
POTASSIUM SERPL-SCNC: 3.2 MEQ/L (ref 3.5–5.1)
PROT SERPL-MCNC: 6.8 G/DL (ref 6–8.2)
RBC # BLD AUTO: 4.89 M/UL (ref 4.5–5.8)
SODIUM SERPL-SCNC: 142 MEQ/L (ref 136–145)
TROPONIN I SERPL HS-MCNC: 3 PG/ML
TROPONIN I SERPL HS-MCNC: 5.7 PG/ML
WBC # BLD AUTO: 7.68 K/UL (ref 3.8–10.5)

## 2024-01-05 PROCEDURE — 84484 ASSAY OF TROPONIN QUANT: CPT

## 2024-01-05 PROCEDURE — 93005 ELECTROCARDIOGRAM TRACING: CPT

## 2024-01-05 PROCEDURE — 85025 COMPLETE CBC W/AUTO DIFF WBC: CPT

## 2024-01-05 PROCEDURE — 80053 COMPREHEN METABOLIC PANEL: CPT

## 2024-01-05 PROCEDURE — 71275 CT ANGIOGRAPHY CHEST: CPT | Mod: ME

## 2024-01-05 PROCEDURE — 99284 EMERGENCY DEPT VISIT MOD MDM: CPT | Mod: 25

## 2024-01-05 PROCEDURE — 83880 ASSAY OF NATRIURETIC PEPTIDE: CPT

## 2024-01-05 PROCEDURE — 71045 X-RAY EXAM CHEST 1 VIEW: CPT

## 2024-01-05 PROCEDURE — 63600105 HC IODINE BASED CONTRAST: Mod: JZ

## 2024-01-05 PROCEDURE — 84484 ASSAY OF TROPONIN QUANT: CPT | Mod: 91

## 2024-01-05 PROCEDURE — 36415 COLL VENOUS BLD VENIPUNCTURE: CPT

## 2024-01-05 RX ORDER — IOPAMIDOL 755 MG/ML
100 INJECTION, SOLUTION INTRAVASCULAR
Status: COMPLETED | OUTPATIENT
Start: 2024-01-05 | End: 2024-01-05

## 2024-01-05 RX ADMIN — IOPAMIDOL 100 ML: 755 INJECTION, SOLUTION INTRAVENOUS at 18:14

## 2024-01-05 ASSESSMENT — ENCOUNTER SYMPTOMS
CHILLS: 0
ABDOMINAL PAIN: 0
SHORTNESS OF BREATH: 0
NAUSEA: 0
VOMITING: 0
FEVER: 0
COUGH: 0

## 2024-01-05 NOTE — ED PROVIDER NOTES
Emergency Medicine Note  HPI   HISTORY OF PRESENT ILLNESS     Dannie Hardin is a 73 y.o. male with past medical history of STEMI, bowel obstruction presenting to emergency department for evaluation of hypoxia.  Patient reports getting a right shoulder replacement with [facility] orthopedics today and upon waking up from anesthesia being noted his pulse ox to be 88%.  Patient stated that he did not have any symptoms.  He was placed on 4 L nasal cannula which brought his oxygen up to 94%.  And denies chest pain, shortness of breath, cough, rhinorrhea, congestion, sore throat, leg swelling.  Patient denies history of blood clots.  He does report taking baby aspirin daily.            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 (CMS/HCC) 05/30/2022       Past Surgical History:   Procedure Laterality Date   • BUNIONECTOMY     • CHOLECYSTECTOMY     • COLON SURGERY  03/12/2015   • COLON SURGERY  07/13/2015   • COLONOSCOPY     • FOOT SURGERY Right    • HEMORROIDECTOMY  2020   • LUMBAR LAMINECTOMY      L3-L4   • RESECTION SMALL BOWEL / CLOSURE ILEOSTOMY         Family History   Problem Relation Age of Onset   • Hyperlipidemia Biological Mother    • Angina Biological Mother    • Hypertension Biological Mother    • Stroke Biological Father    • Diabetes Biological Father        Social History     Tobacco Use   • Smoking status: Former   • Smokeless tobacco: Never   • Tobacco comments:     Quit  25 yrs ago   Vaping Use   • Vaping Use: Former   Substance Use Topics   • Alcohol use: No   • Drug use: No         Review of Systems   REVIEW OF SYSTEMS     Review of Systems   Constitutional: Negative for chills and fever.   Respiratory: Negative for  cough and shortness of breath.    Cardiovascular: Negative for chest pain and leg swelling.   Gastrointestinal: Negative for abdominal pain, nausea and vomiting.         VITALS     ED Vitals    Date/Time Temp Pulse Resp BP SpO2 [facility]   01/05/24 1954 -- 109 21 142/82 93 % LT   01/05/24 1748 -- 102 20 -- 94 % LT   01/05/24 1651 -- 102 19 -- 94 % LT   01/05/24 1622 35.9 °C (96.6 °F) -- -- -- 94 % RB   01/05/24 1618 -- -- -- -- 94 % RB   01/05/24 1617 -- 105 22 146/86 87 % RB        Pulse Ox %: 87 % (01/05/24 1622)  Pulse Ox Interpretation: Low (01/05/24 1622)  Heart Rate: 105 (01/05/24 1622)  Rhythm Strip Interpretation: Sinus Tachycardia (01/05/24 1622)     Physical Exam   PHYSICAL EXAM     Physical Exam  Constitutional:       General: He is not in acute distress.  HENT:      Head: Normocephalic and atraumatic.      Mouth/Throat:      Mouth: Mucous membranes are moist.   Cardiovascular:      Rate and Rhythm: Normal rate and regular rhythm.      Pulses: Normal pulses.      Heart sounds: Normal heart sounds.   Pulmonary:      Effort: Pulmonary effort is normal.      Breath sounds: Normal breath sounds.      Comments: Pulse ox 87% on room air.  94% on 4 L nasal cannula  Abdominal:      General: Abdomen is flat.      Palpations: Abdomen is soft.      Tenderness: There is no abdominal tenderness.   Musculoskeletal:      Cervical back: Neck supple.      Comments: Surgical incision noted to right shoulder   Skin:     General: Skin is warm and dry.      Capillary Refill: Capillary refill takes less than 2 seconds.   Neurological:      General: No focal deficit present.      Mental Status: He is alert and oriented to person, place, and time.   Psychiatric:         Mood and Affect: Mood normal.         Behavior: Behavior normal.           PROCEDURES     Procedures     DATA     Results     Procedure Component Value Units Date/Time    B-type natriuretic peptide [[id number]]  (Normal) Collected: 01/05/24 1642    Specimen: Blood,  Venous Updated: 01/05/24 1753     BNP 23 pg/mL     HS Troponin (with 2 hour reflex) [[id number]]  (Normal) Collected: 01/05/24 1642    Specimen: Blood, Venous Updated: 01/05/24 1719     High Sens Troponin I 3.0 pg/mL     Comprehensive metabolic panel [[id number]]  (Abnormal) Collected: 01/05/24 1642    Specimen: Blood, Venous Updated: 01/05/24 1718     Sodium 142 mEQ/L      Potassium 3.2 mEQ/L      Comment: Results obtained on plasma. Plasma Potassium values may be up to 0.4 mEQ/L less than serum values. The differences may be greater for patients with high platelet or white cell counts.        Chloride 105 mEQ/L      CO2 27 mEQ/L      BUN 19 mg/dL      Creatinine 0.9 mg/dL      Glucose 161 mg/dL      Calcium 8.7 mg/dL      AST (SGOT) 158 IU/L      ALT (SGPT) 140 IU/L      Alkaline Phosphatase 74 IU/L      Total Protein 6.8 g/dL      Comment: Test performed on plasma which typically contains approximately 0.4 g/dL more protein than serum.        Albumin 4.1 g/dL      Bilirubin, Total 2.5 mg/dL      eGFR >60.0 mL/min/1.73m*2      Comment: Calculation based on the Chronic Kidney Disease Epidemiology Collaboration (CKD-EPI) equation refit without adjustment for race.        Anion Gap 10 mEQ/L     CBC and differential [[id number]]  (Abnormal) Collected: 01/05/24 1642    Specimen: Blood, Venous Updated: 01/05/24 1654     WBC 7.68 K/uL      RBC 4.89 M/uL      Hemoglobin 13.6 g/dL      Hematocrit 42.2 %      MCV 86.3 fL      MCH 27.8 pg      MCHC 32.2 g/dL      RDW 12.9 %      Platelets 148 K/uL      MPV 9.9 fL      Differential Type Auto     nRBC 0.0 %      Immature Granulocytes 0.3 %      Neutrophils 91.0 %      Lymphocytes 7.0 %      Monocytes 1.2 %      Eosinophils 0.1 %      Basophils 0.4 %      Immature Granulocytes, Absolute 0.02 K/uL      Neutrophils, Absolute 6.99 K/uL      Lymphocytes, Absolute 0.54 K/uL      Monocytes, Absolute 0.09 K/uL      Eosinophils, Absolute 0.01 K/uL      Basophils, Absolute 0.03 K/uL            Imaging Results          CT ANGIOGRAPHY CHEST PULMONARY EMBOLISM WITH IV CONTRAST (Final result)  Result time 01/05/24 18:48:01    Final result                 Impression:    IMPRESSION:  No evidence for pulmonary embolism.  Right sided pleural thickening and right basilar atelectasis.  Irregular cystic changes in the lung parenchyma bilaterally, is new from prior  examination, and may reflect emphysema or destructive changes related to prior  infection.                 Narrative:    CLINICAL HISTORY: Shortness of breath status post right shoulder arthroplasty  today.    COMMENT: CTA examination of the chest with attention to the pulmonary  vasculature was performed using contiguous 1.25 mm transaxial sections. 3D MIP  and/or volume rendered image reconstruction performed and reviewed.Images were  acquired after the uneventful administration of 100 cc of Isovue-370  intravenously.    CT DOSE:  One or more dose reduction techniques (e.g. automated exposure  control, adjustment of the mA and/or kV according to patient size, use of  iterative reconstruction technique) utilized for this examination    Comparison studies: X-ray from 1/5/2024. CT abdomen and pelvis from 2015..    Heart and pericardium: The heart is normal in size.  There is no pericardial  effusion.    Great vessels: There is no evidence for thoracic aortic aneurysm.  The main  pulmonary artery is normal in caliber.  No large or central filling defects are  identified.    Mediastinum: No mediastinal adenopathy    Jammie: No hilar adenopathy.    Axilla: No adenopathy.. Postsurgical changes in the right shoulder with air in  the surrounding soft tissues related to surgery today.    Lung parenchyma: there are cystic changes in the dependent portions of the lung  fields bilaterally. These were not seen on the 2015 study. These may be related  to lung destruction related to prior infection. This appearance is not classic  for centrilobular emphysema. There  is a chronic appearing right-sided pleural  thickening and dense right lower lobe atelectasis. There is no pneumothorax.    Pleura: Right-sided pleural thickening.    Visualized upper abdominal organs: Cholecystectomy. Hiatal hernia. Septated  right renal cyst.    Bones: Degenerative changes in the spine..                               X-RAY CHEST 1 VIEW (Final result)  Result time 01/05/24 17:46:10    Final result                 Impression:    IMPRESSION:    Right-sided pleural effusion with right basilar airspace disease.             Narrative:    CLINICAL HISTORY: Dyspnea.    COMMENT: A single frontal radiograph of the chest is obtained, and compared to  previous exam from 4/4/2015.    The heart is normal in size.  The mediastinum is within normal limits.  Right-sided pleural effusion and right basilar airspace disease, not present on  prior examination. Left lung is clear. No pneumothorax.  Right shoulder arthroplasty, new from prior.                                ECG 12 lead          Scoring tools                                  ED Course & MDM   MDM / ED COURSE / CLINICAL IMPRESSION / DISPO     Medical Decision Making  Differential diagnosis considered but not limited to pneumonia, viral illness, atelectasis, pulmonary edema    Amount and/or Complexity of Data Reviewed  Labs: ordered.  Radiology: ordered.  ECG/medicine tests: ordered.          ED Course as of 01/06/24 1545   Fri Jan 05, 2024   1749 X-RAY CHEST 1 VIEW  IMPRESSION:     Right-sided pleural effusion with right basilar airspace disease. [VL]   1900 Pt refused viral swab  [VL]   1919 CT ANGIOGRAPHY CHEST PULMONARY EMBOLISM WITH IV CONTRAST  IMPRESSION:  No evidence for pulmonary embolism.  Right sided pleural thickening and right basilar atelectasis.  Irregular cystic changes in the lung parenchyma bilaterally, is new from prior  examination, and may reflect emphysema or destructive changes related to prior  infection. [VL]   1950 Patient is  ambulated and pulse ox dropped to 91 to 92%.  Patient is on that he should stay for admission due to drop in pulse ox.  This is most likely due to paralyzation of the phrenic nerve from nerve block and most likely patient would improve after nerve block wears off however explained to him that this is not guaranteed.  Patient states he has no symptoms.  On room air he at rest patient's sats at 94% [VL]   1957 This patient is choosing to leave against medical advice.  The emergency provider has personally explained to the patient that choosing to do so may result in permanent bodily harm or death.  The emergency provider discussed at great length that without further evaluation and monitoring there may be unforeseen circumstances and/or deterioration causing permanent bodily harm or death as a result of the patient's choice.  Dannie Hardin verbalized these risks back to the physician in layman's terms.  The patient is alert, oriented, and shows the mental capacity to make clear decisions regarding health care at this time. Dannie Hardin continues to wish to leave against medical advice.    In light of this decision to leave AMA, follow-up has been advised and the patient is aware of the importance of following up as instructed.  Dannie Hardin has been advised of the option to return to the ED at any time or with any problems, concerns, or worsening condition. Discharge instructions were provided to the patient.   [VL]      ED Course User Index  [VL] Lindy Lopez PA C     Clinical Impression      Hypoxia     _________________     ED Disposition   AMA                   Lindy Lopez PA C  01/06/24 1544

## 2024-01-06 LAB
ATRIAL RATE: 105
P AXIS: 20
PR INTERVAL: 168
QRS DURATION: 100
QT INTERVAL: 348
QTC CALCULATION(BAZETT): 459
R AXIS: 6
T WAVE AXIS: -27
VENTRICULAR RATE: 105

## 2024-01-06 NOTE — ED ATTESTATION NOTE
I have personally seen and examined the patient.  I personally performed the key components of the encounter and provided a substantive portion of the care and medical decision making for this patient.  I reviewed and agree with physician assistant / nurse practitioner’s assessment and plan of care, with the following exceptions: None  My examination, assessment, and plan of care of Dannie Hardin is as follows:     Exam: Well-appearing, no acute distress, awake alert oriented x 3, regular rate and rhythm, lungs mildly decreased in the right, on nasal cannula, surgical wound to right anterior shoulder clean dry and intact with overlying bandage, no lower extremity edema    Assessment and plan: Patient presents directly from outpatient surgery after her right shoulder replacement with persistently low pulse ox, presents on 4 L nasal cannula, patient denies chest pain or shortness of breath, no cough or recent illness, no history of CHF, x-ray remarkable for elevated right hemidiaphragm, possible volume overload, lab work reassuring, CT performed and shows pleural thickening on the right and atelectasis, emphysematous changes, discussed with Ortho they feel symptoms may be related to effects of the nerve block and can take up to 24 hours to resolve, patient's oxygen requirements improved, feels well, ambulates and still hypoxic so hospitalization recommended, patient declines hospitalization and will sign out AMA, understands risks    Differential diagnoses considered but not limited to, include: Paralyzed hemidiaphragm, aspiration, PE, CHF, viral syndrome     Kurt Cardona,   01/06/24 1105

## 2024-01-06 NOTE — DISCHARGE INSTRUCTIONS
Leaving Against Medical Advice    You are leaving the emergency department / hospital against medical advice.  The emergency medicine provider has recommended you stay for further evaluation, treatment, and monitoring.  As a result of your leaving, there may be unforeseen circumstances and/or deterioration causing you permanent bodily harm or death.  Please follow-up with your doctor for re-evaluation as soon as possible as well as any other physicians listed in your instructions.  Return to the emergency department immediately if you change your mind, your condition begins to change or worsen, or any other problems or concerns.

## 2024-01-23 ENCOUNTER — OFFICE VISIT (OUTPATIENT)
Dept: FAMILY MEDICINE | Facility: CLINIC | Age: 74
End: 2024-01-23
Payer: COMMERCIAL

## 2024-01-23 VITALS
TEMPERATURE: 98.4 F | WEIGHT: 201.6 LBS | SYSTOLIC BLOOD PRESSURE: 136 MMHG | BODY MASS INDEX: 27.3 KG/M2 | HEART RATE: 75 BPM | DIASTOLIC BLOOD PRESSURE: 82 MMHG | HEIGHT: 72 IN | OXYGEN SATURATION: 98 %

## 2024-01-23 DIAGNOSIS — G62.9 NEUROPATHY: ICD-10-CM

## 2024-01-23 DIAGNOSIS — I25.810 CORONARY ARTERY DISEASE INVOLVING CORONARY BYPASS GRAFT OF NATIVE HEART WITHOUT ANGINA PECTORIS: ICD-10-CM

## 2024-01-23 DIAGNOSIS — D69.6 PLATELETS DECREASED (CMS/HCC): Primary | ICD-10-CM

## 2024-01-23 DIAGNOSIS — E78.00 HYPERCHOLESTEREMIA: ICD-10-CM

## 2024-01-23 PROCEDURE — 99214 OFFICE O/P EST MOD 30 MIN: CPT | Performed by: FAMILY MEDICINE

## 2024-01-23 PROCEDURE — 3008F BODY MASS INDEX DOCD: CPT | Performed by: FAMILY MEDICINE

## 2024-01-23 RX ORDER — ATORVASTATIN CALCIUM 40 MG/1
40 TABLET, FILM COATED ORAL DAILY
COMMUNITY

## 2024-01-23 ASSESSMENT — VISUAL ACUITY
OS_CC: 20/20
OD_CC: 20/25

## 2024-01-23 ASSESSMENT — PATIENT HEALTH QUESTIONNAIRE - PHQ9: SUM OF ALL RESPONSES TO PHQ9 QUESTIONS 1 & 2: 0

## 2024-01-23 NOTE — PROGRESS NOTES
[facility]  Dr. Nan Mccain  [address] [place] [address]     Dannie Hardin is a 73 y.o. 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  He has no more shoulder pain.  He has had no blood work since the last time he had a in the ER.  He was not told of the lab abnormalities.  His pulse ox has been fine.          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 (CMS/HCC) 05/30/2022       Past Surgical History:   Procedure Laterality Date   • BUNIONECTOMY     • CHOLECYSTECTOMY     • COLON SURGERY  03/12/2015   • COLON SURGERY  07/13/2015   • COLONOSCOPY     • FOOT SURGERY Right    • HEMORROIDECTOMY  2020   • LUMBAR LAMINECTOMY      L3-L4   • RESECTION SMALL BOWEL / CLOSURE ILEOSTOMY         Social History     Occupational History   • Occupation: retired   Tobacco Use   • Smoking status: Former   • Smokeless tobacco: Never   • Tobacco comments:     Quit  25 yrs ago   Vaping Use   • Vaping Use: Former   Substance and Sexual Activity   • Alcohol use: No   • Drug use: No   • Sexual activity: Defer        Family History   Problem Relation Age of Onset   • Hyperlipidemia Biological Mother    • Angina Biological Mother    • Hypertension Biological Mother    • Stroke Biological Father    • Diabetes Biological Father        No known allergies      Current Outpatient Medications:   •  ascorbic acid, vitamin C, (VITAMIN C) 1,000 mg tablet, Take 100 mg by mouth daily.  , Disp: , Rfl:   •  aspirin 81 MG oral suspension, , Disp: , Rfl:   •  atorvastatin (LIPITOR) 40 mg tablet, Take 40 mg by mouth  daily., Disp: , Rfl:   •  b complex vitamins tablet, Take 1 tablet by mouth daily., Disp: , Rfl:   •  cholecalciferol, vitamin D3, 25 mcg (1,000 unit) capsule, Take 1,000 Units by mouth daily., Disp: , Rfl:   •  chromium picolinate 1,000 mcg tablet, Take 1 capsule by mouth daily. Stopped for surgery , Disp: , Rfl:   •  cyanocobalamin, vitamin B-12, (VITAMIN B-12 ORAL), Take 100 mcg by mouth daily., Disp: , Rfl:   •  fluticasone propionate (FLONASE) 50 mcg/actuation nasal spray, Administer 1 spray into each nostril daily., Disp: 1 g, Rfl: 3  •  gabapentin (NEURONTIN) 600 mg tablet, Take 1 tablet (600 mg total) by mouth 3 (three) times a day., Disp: 270 tablet, Rfl: 3  •  garlic 1,000 mg capsule, Take by mouth daily. 6 capsules daily ( Stopped for surgery) , Disp: , Rfl:   •  glucos sul 2KCl/msm/chond/C/Mn (GLUCOSAMINE CHONDROITIN ORAL), Take 1 tablet by mouth daily. Stopped for surgery, Disp: , Rfl:   •  lactobacillus 3/FOS/pantethine (PROBIOTIC AND ACIDOPHILUS ORAL), Take 1 capsule by mouth daily.  , Disp: , Rfl:   •  magnesium 30 mg tablet, Take 30 mg by mouth 2 (two) times a day., Disp: , Rfl:   •  metoprolol succinate XL (TOPROL-XL) 25 mg 24 hr tablet, Take 25 mg by mouth once daily., Disp: , Rfl:   •  multivitamin (THERAGRAN) tablet, Take 1 tablet by mouth daily.  , Disp: , Rfl:   •  niacin 500 mg tablet, Take 500 mg by mouth daily with breakfast., Disp: , Rfl:   •  omega 3-dha-epa-fish oil 138-183-1,000 mg capsule, Take 1 capsule by mouth daily. Stopped for surgery , Disp: , Rfl:   •  omeprazole (PriLOSEC) 20 mg capsule, Take 20 mg by mouth daily before breakfast., Disp: , Rfl:   •  thiamine 100 mg tablet, Take 100 mg by mouth daily., Disp: , Rfl:   •  vitamin E acetate (VITAMIN E ORAL), Take 400 Units by mouth once., Disp: , Rfl:   •  sildenafiL (VIAGRA) 100 mg tablet, Take 1 tablet (100 mg total) by mouth daily as needed for erectile dysfunction., Disp: 10 tablet, Rfl: 1    Review of Systems    Vitals:     01/23/24 1034   BP: 136/82   BP Location: Right upper arm   Patient Position: Sitting   Pulse: 75   Temp: 36.9 °C (98.4 °F)   TempSrc: Oral   SpO2: 98%   Weight: 91.4 kg (201 lb 9.6 oz)   Height: 1.829 m (6')     Body mass index is 27.34 kg/m².    Physical Exam  Vitals reviewed.   Constitutional:       General: He is not in acute distress.  HENT:      Right Ear: Tympanic membrane normal.      Left Ear: Tympanic membrane normal.      Mouth/Throat:      Mouth: Mucous membranes are moist.   Eyes:      Conjunctiva/sclera: Conjunctivae normal.   Neck:      Thyroid: No thyromegaly.   Cardiovascular:      Rate and Rhythm: Normal rate and regular rhythm.      Heart sounds: No murmur heard.  Pulmonary:      Effort: Pulmonary effort is normal.      Breath sounds: Normal breath sounds. No rales.   Abdominal:      General: Bowel sounds are normal.      Palpations: Abdomen is soft. There is no mass.      Tenderness: There is no abdominal tenderness.   Musculoskeletal:      Cervical back: Normal range of motion.      Right lower leg: No edema.      Left lower leg: No edema.   Skin:     Findings: No rash.      Comments: Scabs noted.   Neurological:      General: No focal deficit present.      Mental Status: He is alert and oriented to person, place, and time.      Gait: Gait normal.   Psychiatric:         Mood and Affect: Mood normal.             Assessment   Problem List Items Addressed This Visit        Nervous    Neuropathy    Relevant Orders    CBC and Differential    Comprehensive metabolic panel    Lipid panel    TSH 3rd Generation    Urinalysis with Reflex Culture (ED and Outpatient only)       Circulatory    Coronary artery disease involving coronary bypass graft of native heart     Followed by cardiology.         Relevant Medications    atorvastatin (LIPITOR) 40 mg tablet    Other Relevant Orders    CBC and Differential    Comprehensive metabolic panel    Lipid panel    TSH 3rd Generation    Urinalysis with Reflex Culture  (ED and Outpatient only)       Hematologic    Platelets decreased (CMS/HCC) - Primary     He agreed to repeat blood work         Relevant Orders    CBC and Differential    Comprehensive metabolic panel    Lipid panel    TSH 3rd Generation    Urinalysis with Reflex Culture (ED and Outpatient only)       Other    Hypercholesteremia     Patient made aware of elevated liver enzymes.  He is now on a lower dose of the Lipitor.  Form completed.         Relevant Medications    atorvastatin (LIPITOR) 40 mg tablet           Nan Mccain MD  1/23/2024

## 2024-02-20 LAB
ALBUMIN SERPL-MCNC: 4.4 G/DL (ref 3.8–4.8)
ALBUMIN/GLOB SERPL: 1.9 {RATIO} (ref 1.2–2.2)
ALP SERPL-CCNC: 66 IU/L (ref 44–121)
ALT SERPL-CCNC: 22 IU/L (ref 0–44)
APPEARANCE UR: CLEAR
AST SERPL-CCNC: 29 IU/L (ref 0–40)
BACTERIA #/AREA URNS HPF: ABNORMAL /[HPF]
BASOPHILS # BLD AUTO: 0.1 X10E3/UL (ref 0–0.2)
BASOPHILS NFR BLD AUTO: 1 %
BILIRUB SERPL-MCNC: 2.4 MG/DL (ref 0–1.2)
BILIRUB UR QL STRIP: NEGATIVE
BUN SERPL-MCNC: 15 MG/DL (ref 8–27)
BUN/CREAT SERPL: 16 (ref 10–24)
CALCIUM SERPL-MCNC: 9.5 MG/DL (ref 8.6–10.2)
CASTS URNS QL MICRO: ABNORMAL /LPF
CHLORIDE SERPL-SCNC: 106 MMOL/L (ref 96–106)
CHOLEST SERPL-MCNC: 115 MG/DL (ref 100–199)
CO2 SERPL-SCNC: 24 MMOL/L (ref 20–29)
COLOR UR: YELLOW
CREAT SERPL-MCNC: 0.94 MG/DL (ref 0.76–1.27)
CRYSTALS URNS MICRO: ABNORMAL
EGFRCR SERPLBLD CKD-EPI 2021: 86 ML/MIN/1.73
EOSINOPHIL # BLD AUTO: 0.3 X10E3/UL (ref 0–0.4)
EOSINOPHIL NFR BLD AUTO: 6 %
EPI CELLS #/AREA URNS HPF: ABNORMAL /HPF (ref 0–10)
ERYTHROCYTE [DISTWIDTH] IN BLOOD BY AUTOMATED COUNT: 12.9 % (ref 11.6–15.4)
GLOBULIN SER CALC-MCNC: 2.3 G/DL (ref 1.5–4.5)
GLUCOSE SERPL-MCNC: 104 MG/DL (ref 70–99)
GLUCOSE UR QL STRIP: NEGATIVE
HCT VFR BLD AUTO: 43.1 % (ref 37.5–51)
HDLC SERPL-MCNC: 40 MG/DL
HGB BLD-MCNC: 13.9 G/DL (ref 13–17.7)
HGB UR QL STRIP: NEGATIVE
IMM GRANULOCYTES # BLD AUTO: 0 X10E3/UL (ref 0–0.1)
IMM GRANULOCYTES NFR BLD AUTO: 0 %
KETONES UR QL STRIP: NEGATIVE
LAB CORP URINALYSIS REFLEX: NORMAL
LDLC SERPL CALC-MCNC: 44 MG/DL (ref 0–99)
LEUKOCYTE ESTERASE UR QL STRIP: NEGATIVE
LYMPHOCYTES # BLD AUTO: 1.5 X10E3/UL (ref 0.7–3.1)
LYMPHOCYTES NFR BLD AUTO: 28 %
MCH RBC QN AUTO: 27.6 PG (ref 26.6–33)
MCHC RBC AUTO-ENTMCNC: 32.3 G/DL (ref 31.5–35.7)
MCV RBC AUTO: 86 FL (ref 79–97)
MICRO URNS: NORMAL
MICRO URNS: NORMAL
MONOCYTES # BLD AUTO: 0.5 X10E3/UL (ref 0.1–0.9)
MONOCYTES NFR BLD AUTO: 8 %
NEUTROPHILS # BLD AUTO: 3.1 X10E3/UL (ref 1.4–7)
NEUTROPHILS NFR BLD AUTO: 57 %
NITRITE UR QL STRIP: NEGATIVE
PH UR STRIP: 5.5 [PH] (ref 5–7.5)
PLATELET # BLD AUTO: 198 X10E3/UL (ref 150–450)
POTASSIUM SERPL-SCNC: 4.3 MMOL/L (ref 3.5–5.2)
PROT SERPL-MCNC: 6.7 G/DL (ref 6–8.5)
PROT UR QL STRIP: NEGATIVE
RBC # BLD AUTO: 5.04 X10E6/UL (ref 4.14–5.8)
RBC #/AREA URNS HPF: ABNORMAL /HPF (ref 0–2)
SODIUM SERPL-SCNC: 144 MMOL/L (ref 134–144)
SP GR UR STRIP: 1.02 (ref 1–1.03)
TRIGL SERPL-MCNC: 191 MG/DL (ref 0–149)
TSH SERPL DL<=0.005 MIU/L-ACNC: 2.64 UIU/ML (ref 0.45–4.5)
UNIDENT CRYS URNS QL MICRO: PRESENT
UROBILINOGEN UR STRIP-MCNC: 0.2 MG/DL (ref 0.2–1)
VLDLC SERPL CALC-MCNC: 31 MG/DL (ref 5–40)
WBC # BLD AUTO: 5.5 X10E3/UL (ref 3.4–10.8)
WBC #/AREA URNS HPF: ABNORMAL /HPF (ref 0–5)

## 2024-03-05 ENCOUNTER — TELEPHONE (OUTPATIENT)
Dept: FAMILY MEDICINE | Facility: CLINIC | Age: 74
End: 2024-03-05
Payer: COMMERCIAL

## 2024-03-05 NOTE — TELEPHONE ENCOUNTER
----- Message from Nan Mccain MD sent at 3/5/2024  6:37 AM EST -----  Please schedule an appointment to go over abnormal labs & follow up in 2 to 3 weeks.

## 2024-04-03 DIAGNOSIS — I25.810 CORONARY ARTERY DISEASE INVOLVING CORONARY BYPASS GRAFT OF NATIVE HEART WITHOUT ANGINA PECTORIS: ICD-10-CM

## 2024-04-03 DIAGNOSIS — E78.00 HYPERCHOLESTEREMIA: Primary | ICD-10-CM

## 2024-04-03 DIAGNOSIS — G62.9 NEUROPATHY: ICD-10-CM

## 2024-04-03 DIAGNOSIS — Z79.899 OTHER LONG TERM (CURRENT) DRUG THERAPY: ICD-10-CM

## 2024-04-18 LAB
ALBUMIN SERPL-MCNC: 4.7 G/DL (ref 3.8–4.8)
ALP SERPL-CCNC: 78 IU/L (ref 44–121)
ALT SERPL-CCNC: 20 IU/L (ref 0–44)
AST SERPL-CCNC: 28 IU/L (ref 0–40)
BILIRUB DIRECT SERPL-MCNC: 0.35 MG/DL (ref 0–0.4)
BILIRUB SERPL-MCNC: 1.9 MG/DL (ref 0–1.2)
BUN SERPL-MCNC: 23 MG/DL (ref 8–27)
BUN/CREAT SERPL: 29 (ref 10–24)
CALCIUM SERPL-MCNC: 9.5 MG/DL (ref 8.6–10.2)
CHLORIDE SERPL-SCNC: 103 MMOL/L (ref 96–106)
CHOLEST SERPL-MCNC: 104 MG/DL (ref 100–199)
CO2 SERPL-SCNC: 25 MMOL/L (ref 20–29)
CREAT SERPL-MCNC: 0.78 MG/DL (ref 0.76–1.27)
EGFRCR SERPLBLD CKD-EPI 2021: 94 ML/MIN/1.73
GLUCOSE SERPL-MCNC: 99 MG/DL (ref 70–99)
HBA1C MFR BLD: 5.6 % (ref 4.8–5.6)
HDLC SERPL-MCNC: 45 MG/DL
LDLC SERPL CALC-MCNC: 40 MG/DL (ref 0–99)
PHOSPHATE SERPL-MCNC: 3.5 MG/DL (ref 2.8–4.1)
POTASSIUM SERPL-SCNC: 4.8 MMOL/L (ref 3.5–5.2)
PROT SERPL-MCNC: 7 G/DL (ref 6–8.5)
SODIUM SERPL-SCNC: 142 MMOL/L (ref 134–144)
TRIGL SERPL-MCNC: 100 MG/DL (ref 0–149)
VLDLC SERPL CALC-MCNC: 19 MG/DL (ref 5–40)

## 2024-08-11 DIAGNOSIS — R52 PAIN: ICD-10-CM

## 2024-08-12 RX ORDER — GABAPENTIN 600 MG/1
600 TABLET ORAL 3 TIMES DAILY
Qty: 270 TABLET | Refills: 3 | Status: SHIPPED | OUTPATIENT
Start: 2024-08-12 | End: 2024-09-03 | Stop reason: SDUPTHER

## 2024-09-03 DIAGNOSIS — R52 PAIN: ICD-10-CM

## 2024-09-04 RX ORDER — GABAPENTIN 600 MG/1
600 TABLET ORAL 3 TIMES DAILY
Qty: 270 TABLET | Refills: 3 | Status: SHIPPED | OUTPATIENT
Start: 2024-09-04

## 2025-07-25 ENCOUNTER — PATIENT OUTREACH (OUTPATIENT)
Dept: PRIMARY CARE | Facility: CLINIC | Age: 75
End: 2025-07-25
Payer: COMMERCIAL

## 2025-07-25 NOTE — PROGRESS NOTES
Care Gap Team has outreached to Dannie Hardin on behalf of their primary care provider.      Care Gap Source: [facility]    Care Gap(s) Identified: Annual Wellness Visit    Care Gap Status: Due    Outreach via: Telephone    Adult Preventive Wellness Protocol(s) Used: Annual Wellness Visit    Chart Review Completed: Yes  Patient interview completed: No  Inclusion Criteria: Met  Exclusion Criteria: None  Patient educated on recommended care: No    Order or Clinical Referral: None          Appointment provided: No                  Attempted to call pt, phone continued to ring until it hung up. No VM box to leave message. Will mail unable to contact letter to pt home.

## 2025-08-18 DIAGNOSIS — R52 PAIN: ICD-10-CM

## 2025-08-18 RX ORDER — GABAPENTIN 600 MG/1
600 TABLET ORAL 3 TIMES DAILY
Qty: 270 TABLET | Refills: 3 | Status: SHIPPED | OUTPATIENT
Start: 2025-08-18

## 2025-08-18 RX ORDER — GABAPENTIN 600 MG/1
600 TABLET ORAL 3 TIMES DAILY
Qty: 270 TABLET | Refills: 3 | OUTPATIENT
Start: 2025-08-18

## (undated) DEVICE — TAPE DURAPORE 3IN

## (undated) DEVICE — DRESSING ABD STER LGE 8X10

## (undated) DEVICE — MANIFOLD SINGLE PORT NEPTUNE

## (undated) DEVICE — Device

## (undated) DEVICE — GLOVE SURG PROTEXIS PF 8.5

## (undated) DEVICE — SUTURE VICRYL 2-0 J317H SH VIOLET

## (undated) DEVICE — DRAPE LAPAROTOMY

## (undated) DEVICE — GOWN SURGICAL REINFORCED X-LAR

## (undated) DEVICE — SOLN IRRIG .9%SOD 1000ML

## (undated) DEVICE — MEDC BRIEFS MESH 3XL 2/PK LT BLUE

## (undated) DEVICE — SYRINGE DISP LUER-LOK 10 CC

## (undated) DEVICE — SUTURE VICRYL 3-0 J416H SH UNDYED

## (undated) DEVICE — SPONGE GAUZE 4X4 4 PLY-2S

## (undated) DEVICE — SOLN BETADINE 4 OZ

## (undated) DEVICE — GLOVE SZ 8.5 LINER PROTEXIS PI BL

## (undated) DEVICE — ***USE 138510*** SUTURE PROLENE  4-0  8831H

## (undated) DEVICE — TUBING SMOKE EVAC PENCIL COATED

## (undated) DEVICE — SWABSTICK BETADINE